# Patient Record
Sex: FEMALE | Race: WHITE | NOT HISPANIC OR LATINO | Employment: OTHER | URBAN - METROPOLITAN AREA
[De-identification: names, ages, dates, MRNs, and addresses within clinical notes are randomized per-mention and may not be internally consistent; named-entity substitution may affect disease eponyms.]

---

## 2017-01-11 ENCOUNTER — OFFICE VISIT (OUTPATIENT)
Dept: OTOLARYNGOLOGY | Facility: CLINIC | Age: 77
End: 2017-01-11

## 2017-01-11 DIAGNOSIS — R49.0 DYSPHONIA: Primary | ICD-10-CM

## 2017-01-11 NOTE — PROGRESS NOTES
[facility]  Franklin Merlos Jr., M.D., F.A.C.S.  Almaz Lafleur M.D., M.P.H.  Jolly George, Ph.D., CCC/SLP  Charito Parker M.M. (voice), M.A., CCC/SLP  Aguila Almaguer M.M. (voice), M.A., [facility]/SLP    [facility]  VOICE/SPEECH/BREATHING THERAPY PROGRESS REPORT    Patient: Kate Lima  Date of Service: 1/11/2017    PROGRESS SINCE LAST SESSION  Ms. Lima was seen for evaluation on 12/9/16.  At that time, it was determined that:    her dysphonia is accounted for by laryngeal irritation and supraglottic hyperfunction.  There were no suspicious lesions in the larynx or on the vocal fold mucosa.    because there appears to be a functional component to her dysphonia, she would most likely benefit from functional speech therapy.    She is entirely amenable to this plan    RATIONALE: Current level of functioning is:  CJ - At least 20 percent but less than 40 percent impaired, limited, or restricted  based on Ms. Lima's extent of dysphonia, extent of impact on function, extent of discomfort, level of effort.     Since then, she states she has not had any change in her voice, which is expected, as no therapeutic suggestions were made at the time.    Ms. Lima also states that:    She did look a little bit at the /s/ words and loaded phrases that she received from Charlotte Gage during her last course of therapy.      Ms. Lima presents today with the following:    Breathing pattern: appears within normal limits and adequate  and inspirations are inadequate in volume and frequency, breath is not well connected to phonation    Tension is evident: upper body and neck and shoulders    Voice quality is characterized by    Moderate to severe glottal charlton vs roughness    Habitual pitch is around G3    THERAPEUTIC ACTIVITIES  Today Ms. Lima participated in the following therapeutic activities:    Asked many questions about the nature of her symptoms, and I answered all of these thoroughly.    [place]  concepts and technqiues for using saline and plain-water gargling, nasal irrigation, and steam to reduce the thickened secretions / laryngeal irritation.  I recommended avoiding alcohol based mouthwash, and trying Biotine, to help reduce oral dryness and irritation    I provided additional dry mouth reduction strategies.    [place] concepts and techniques for improving topical and systemic hydration     [place] concepts and strategies managing laryngopharyngeal reflux disorder, to reduce laryngeal inflammation.    I provided instruction for techniques and strategies to reduce the chronic cough/throat clearing  o alternative behaviors such as voiceless glottic coup, humming, swallowing, etc. were taught  o strategies for reducing mucosal irritation were taught    [place] basic techniques for optimal breathing technique.    [place] exercises to add phonation to the optimal flowing airstream.  o Semi-occluded vocal tract exercises with straw phonation and water resistance were most facilitating  o Instructed to use as a voice warm up, cool down, coordination of breath flow with phonation, and for tissue mobilization.  o good learning, but will need practice     [place] exercises for improved glottic closure.  o Difficulty producing an acceptable glottic coup; will continue instruction during therapy sessions    [place] exercises for improved airflow during phonation.  o speech material with easy onsets and glides was facilitating  o Progressed from transitioning between voiceless and voiced sounds to H+vowel and word level  o Instructed at the word and phrase level.  o learned techniques to reduce glottal charlton and improve breath flow    [place] strategies to reduce jaw tension  o I recommended practicing a 2 finger width stretch; this was helpful    [place] concepts of an optimal regimen for practice.  o she should use an interval schedule of practice, with brief periods of practice frequently throughout each  day  o [place] concepts of volitional practice to facilitate motor learning.    I provided an after visit summary to help facilitate practice.    An audio recording of today's therapeutic activities was provided, to facilitate practice.    IMPRESSIONS/GOALS/PLAN  Ms. Lima had a productive session of therapy today, working on techniques/strategies/exercises that will help her achieve her goal of acceptable and comfortable voice use, secondary to dysphonia; allowing her to meet personal and professional vocal demands and fully engage in activities of daily living. She will continue to work on her exercises on a daily basis, and work on incorporating the techniques into her daily vocal activities.    Progress toward long-term goals:   Minimal at this point, as this is first session, but good learning today    Goals for this practice period:     practice all exercises according to instructions    incorporate techniques into daily vocal activities    maintain vigilance for vocal technique    Plan: I will see Ms. Lima in early March to work on education, modification, and carryover of therapeutic activities to more complex phonatory tasks.    PRIMARY ICD-10 code: R49.0 (Dysphonia)    TOTAL SERVICE TIME: 60 minutes  TREATMENT (09242): 60 minutes  NO CHARGE FACILITY FEE (29310)    Charito Parker M.M. (voice) MOlgaA., CCC/SLP  Speech-Language Pathologist  [facility]  [phone]

## 2017-01-11 NOTE — Clinical Note
1/11/2017       RE: Kate Lima  [address] [address]     Dear Colleague,    Thank you for referring your patient, Kate Lima, to the [facility]. Please see a copy of my visit note below.    [facility]  Franklin Merlos Jr., M.D., F.A.C.S.  Almaz Lafleur M.D., M.P.H.  Jolly George, Ph.D., CCC/SLP  Charito Parker M.M. (voice), M.A., CCC/SLP  Aguila Almaguer M.M. (voice), M.A., [facility]/SLP    [facility]  VOICE/SPEECH/BREATHING THERAPY PROGRESS REPORT    Patient: Kate Lima  Date of Service: 1/11/2017    PROGRESS SINCE LAST SESSION  Ms. Lima was seen for evaluation on 12/9/16.  At that time, it was determined that:    her dysphonia is accounted for by laryngeal irritation and supraglottic hyperfunction.  There were no suspicious lesions in the larynx or on the vocal fold mucosa.    because there appears to be a functional component to her dysphonia, she would most likely benefit from functional speech therapy.    She is entirely amenable to this plan    RATIONALE: Current level of functioning is:  CJ - At least 20 percent but less than 40 percent impaired, limited, or restricted  based on Ms. Lima's extent of dysphonia, extent of impact on function, extent of discomfort, level of effort.     Since then, she states she has not had any change in her voice, which is expected, as no therapeutic suggestions were made at the time.    Ms. Lima also states that:    She did look a little bit at the /s/ words and loaded phrases that she received from Charlotte Gage during her last course of therapy.      Ms. Lima presents today with the following:    Breathing pattern: appears within normal limits and adequate  and inspirations are inadequate in volume and frequency, breath is not well connected to phonation    Tension is evident: upper body and neck and shoulders    Voice quality is characterized by    Moderate to  severe glottal charlton vs roughness    Habitual pitch is around G3    THERAPEUTIC ACTIVITIES  Today Ms. Lima participated in the following therapeutic activities:    Asked many questions about the nature of her symptoms, and I answered all of these thoroughly.    [place] concepts and technqiues for using saline and plain-water gargling, nasal irrigation, and steam to reduce the thickened secretions / laryngeal irritation.  I recommended avoiding alcohol based mouthwash, and trying Biotine, to help reduce oral dryness and irritation    I provided additional dry mouth reduction strategies.    [place] concepts and techniques for improving topical and systemic hydration     [place] concepts and strategies managing laryngopharyngeal reflux disorder, to reduce laryngeal inflammation.    I provided instruction for techniques and strategies to reduce the chronic cough/throat clearing  o alternative behaviors such as voiceless glottic coup, humming, swallowing, etc. were taught  o strategies for reducing mucosal irritation were taught    [place] basic techniques for optimal breathing technique.    [place] exercises to add phonation to the optimal flowing airstream.  o Semi-occluded vocal tract exercises with straw phonation and water resistance were most facilitating  o Instructed to use as a voice warm up, cool down, coordination of breath flow with phonation, and for tissue mobilization.  o good learning, but will need practice     [place] exercises for improved glottic closure.  o Difficulty producing an acceptable glottic coup; will continue instruction during therapy sessions    [place] exercises for improved airflow during phonation.  o speech material with easy onsets and glides was facilitating  o Progressed from transitioning between voiceless and voiced sounds to H+vowel and word level  o Instructed at the word and phrase level.  o learned techniques to reduce glottal charlton and improve breath flow    [place]  strategies to reduce jaw tension  o I recommended practicing a 2 finger width stretch; this was helpful    [place] concepts of an optimal regimen for practice.  o she should use an interval schedule of practice, with brief periods of practice frequently throughout each day  o [place] concepts of volitional practice to facilitate motor learning.    I provided an after visit summary to help facilitate practice.    An audio recording of today's therapeutic activities was provided, to facilitate practice.    IMPRESSIONS/GOALS/PLAN  Ms. Lima had a productive session of therapy today, working on techniques/strategies/exercises that will help her achieve her goal of acceptable and comfortable voice use, secondary to dysphonia; allowing her to meet personal and professional vocal demands and fully engage in activities of daily living. She will continue to work on her exercises on a daily basis, and work on incorporating the techniques into her daily vocal activities.    Progress toward long-term goals:   Minimal at this point, as this is first session, but good learning today    Goals for this practice period:     practice all exercises according to instructions    incorporate techniques into daily vocal activities    maintain vigilance for vocal technique    Plan: I will see Ms. Lima in early March to work on education, modification, and carryover of therapeutic activities to more complex phonatory tasks.    PRIMARY ICD-10 code: R49.0 (Dysphonia)    TOTAL SERVICE TIME: 60 minutes  TREATMENT (13685): 60 minutes  NO CHARGE FACILITY FEE (37418)    Charito Parker M.M. (voice), M.A., CCC/SLP  Speech-Language Pathologist  [facility]  [phone]                  After Visit Summary    Patient: Kate Lima  Date of Visit: 1/11/2017    Hygiene:     Systemic Hydration: internal hydration of the entire body  o Continue to sip water regularly    *Try herbal teas      Gargle  o With a voice  o Tilt your head side to  "side  o [place] chirp -  betty hernández     Dry hard swallow    Suck on a lozenge with Pectin (avoid mint or menthol) or a sugar-free./ xylitol candy, gum, \"wet\" snacks (apples, pineapple, grapes, etc)   * Check with your dentist about using Biotine mouthwash and toothpaste.      Breathing:    In the morning and evening (twice daily) for 2-5 minutes:   o Breathe while lying on your back with your face and knees up. Hands on tummy and chest.  Take a breath in with rounded lips and exhale with a  Sh    o Inhale  = Inflate; exhale = deflate  o Throughout the day (2-3x/day for just a couple minutes) check breathing while keeping shoulders relaxed (riding to and from school, etc.)    Breathing Tips:  o Keep shoulders down, and chest relaxed    Voice:    Bubbles (straw in 1.5 to 2  of water):  o blow 10-15 seconds with no voice and keep bubbles consistent.  o 3x: blow bubbles and add a sustained  who  or an  oo  (G3)  o 3x: blow bubbles and vary  who  gliding up and down             Up and down like a sine wave  o 3x: blow bubbles on a sustained/ varied pitch soft to loud to soft (messa di voce)    o 1-2x: Happy birthday bubbles (keep connected)  These exercises are great for:    *warm up / cool down - Part of the morning routing and before and after extended voice use.    *tissue mobilization exercise - Improving the condition and pliability of the vocal folds.    *Abdominal breathing and applying optimal breath flow to speech/singing.         5x alternating between voiceless and voiced: SSSSSsssssZZzzzzzzzzzzSSssssss     u  words (2-3 x per day)  o 5 words with use of arm to help direct pitch and breath flow  o Breathe first and add a  yawn-sigh  shape to sound    Speak as if to 5-8 people       Spacious speech phrases  (2-3x per day)  o 2nd column: H+ vowel    Work on relaxing your jaw - use a mirror to help     Speak as if to 5-8 people    *Jaw relaxation - 2 fingers hold for 5 seconds and repeat 3x; " 3-4x/day    *Bring Lunch and Learn presentation to next session.    Charito Parker M.M. (voice), M.A., CCC/SLP  Speech-Language Pathologist  [facility]  [phone]

## 2017-01-11 NOTE — PROGRESS NOTES
"After Visit Summary    Patient: Kate Lima  Date of Visit: 1/11/2017    Hygiene:     Systemic Hydration: internal hydration of the entire body  o Continue to sip water regularly    *Try herbal teas      Gargle  o With a voice  o Tilt your head side to side  o [place] chirp -  betty roqueaa     Dry hard swallow    Suck on a lozenge with Pectin (avoid mint or menthol) or a sugar-free./ xylitol candy, gum, \"wet\" snacks (apples, pineapple, grapes, etc)   * Check with your dentist about using Biotine mouthwash and toothpaste.      Breathing:    In the morning and evening (twice daily) for 2-5 minutes:   o Breathe while lying on your back with your face and knees up. Hands on tummy and chest.  Take a breath in with rounded lips and exhale with a  Sh    o Inhale  = Inflate; exhale = deflate  o Throughout the day (2-3x/day for just a couple minutes) check breathing while keeping shoulders relaxed (riding to and from school, etc.)    Breathing Tips:  o Keep shoulders down, and chest relaxed    Voice:    Bubbles (straw in 1.5 to 2  of water):  o blow 10-15 seconds with no voice and keep bubbles consistent.  o 3x: blow bubbles and add a sustained  who  or an  oo  (G3)  o 3x: blow bubbles and vary  who  gliding up and down             Up and down like a sine wave  o 3x: blow bubbles on a sustained/ varied pitch soft to loud to soft (messa di voce)    o 1-2x: Happy birthday bubbles (keep connected)  These exercises are great for:    *warm up / cool down - Part of the morning routing and before and after extended voice use.    *tissue mobilization exercise - Improving the condition and pliability of the vocal folds.    *Abdominal breathing and applying optimal breath flow to speech/singing.         5x alternating between voiceless and voiced: SSSSSsssssZZzzzzzzzzzzSSssssss     u  words (2-3 x per day)  o 5 words with use of arm to help direct pitch and breath flow  o Breathe first and add a  yawn-sigh  shape to " sound    Speak as if to 5-8 people       Spacious speech phrases  (2-3x per day)  o 2nd column: H+ vowel    Work on relaxing your jaw - use a mirror to help     Speak as if to 5-8 people    *Jaw relaxation - 2 fingers hold for 5 seconds and repeat 3x; 3-4x/day    *Bring Lunch and Learn presentation to next session.    Charito Parker M.M. (voice), MOlgaA., CCC/SLP  Speech-Language Pathologist  [facility]  [phone]

## 2017-01-12 ENCOUNTER — APPOINTMENT (OUTPATIENT)
Dept: GENERAL RADIOLOGY | Facility: CLINIC | Age: 77
End: 2017-01-12
Attending: EMERGENCY MEDICINE
Payer: MEDICARE

## 2017-01-12 ENCOUNTER — HOSPITAL ENCOUNTER (EMERGENCY)
Facility: CLINIC | Age: 77
Discharge: HOME | End: 2017-01-12
Attending: EMERGENCY MEDICINE | Admitting: EMERGENCY MEDICINE
Payer: MEDICARE

## 2017-01-12 VITALS
RESPIRATION RATE: 15 BRPM | TEMPERATURE: 97.7 F | DIASTOLIC BLOOD PRESSURE: 73 MMHG | OXYGEN SATURATION: 99 % | SYSTOLIC BLOOD PRESSURE: 136 MMHG | HEIGHT: 64 IN | HEART RATE: 73 BPM

## 2017-01-12 DIAGNOSIS — S62.667B OPEN NONDISPLACED FRACTURE OF DISTAL PHALANX OF LEFT LITTLE FINGER, INITIAL ENCOUNTER: ICD-10-CM

## 2017-01-12 PROCEDURE — 29130 APPL FINGER SPLINT STATIC: CPT | Mod: F4

## 2017-01-12 PROCEDURE — 99284 EMERGENCY DEPT VISIT MOD MDM: CPT

## 2017-01-12 PROCEDURE — A9270 NON-COVERED ITEM OR SERVICE: HCPCS | Mod: GY | Performed by: EMERGENCY MEDICINE

## 2017-01-12 PROCEDURE — 25000132 ZZH RX MED GY IP 250 OP 250 PS 637: Mod: GY | Performed by: EMERGENCY MEDICINE

## 2017-01-12 PROCEDURE — 73140 X-RAY EXAM OF FINGER(S): CPT | Mod: LT

## 2017-01-12 RX ORDER — CEPHALEXIN 500 MG/1
500 CAPSULE ORAL 3 TIMES DAILY
Qty: 21 CAPSULE | Refills: 0 | Status: SHIPPED | OUTPATIENT
Start: 2017-01-12 | End: 2017-01-19

## 2017-01-12 RX ORDER — CEPHALEXIN 500 MG/1
500 CAPSULE ORAL ONCE
Status: COMPLETED | OUTPATIENT
Start: 2017-01-12 | End: 2017-01-12

## 2017-01-12 RX ADMIN — CEPHALEXIN 500 MG: 500 CAPSULE ORAL at 22:46

## 2017-01-12 NOTE — ED AVS SNAPSHOT
Emergency Department    [address] [place] [address]    Phone:  [phone]    Fax:  [phone]                                       Kate Lima   MRN: [id number]    Department:   Emergency Department   Date of Visit:  1/12/2017           After Visit Summary Signature Page     I have received my discharge instructions, and my questions have been answered. I have discussed any challenges I see with this plan with the nurse or doctor.    ..........................................................................................................................................  Patient/Patient Representative Signature      ..........................................................................................................................................  Patient Representative Print Name and Relationship to Patient    ..................................................               ................................................  Date                                            Time    ..........................................................................................................................................  Reviewed by Signature/Title    ...................................................              ..............................................  Date                                                            Time

## 2017-01-12 NOTE — ED AVS SNAPSHOT
Emergency Department    [address] [facility] [address]    Phone:  [phone]    Fax:  [phone]                                       Kate Lima   MRN: [id number]    Department:   Emergency Department   Date of Visit:  1/12/2017           Patient Information     Date Of Birth          1940        Your diagnoses for this visit were:     Open nondisplaced fracture of distal phalanx of left little finger, initial encounter        You were seen by Isabelle Fernando MD.      Follow-up Information     Schedule an appointment as soon as possible for a visit with Bao Logan MD.    Specialty:  Internal Medicine    Why:  As needed    Contact information:    [facility]  [address] [address]  [phone]          Discharge Instructions       Ice, elevate, change the bandaid daily, splint for protection and comfort.  Recheck in the clinic if you develop any signs of infection.  Aleve as needed.          Finger Fracture, Open  You have a broken finger (fracture) with a nearby cut, puncture, or deep scrape. This causes local pain, swelling, and bruising. Because of the open injury, you are at risk for infection in the skin and bone. You will take antibiotics to lower the risk for infection.   This injury takes about 4 weeks to heal. Finger injuries are often treated with a splint or cast, or by taping the injured finger to the next one (buddy taping). This protects the injured finger and holds the bone in position while it heals. More serious fractures may need surgery.  If the fingernail has been severely injured, it will probably fall off in 1 to 2 weeks. A new fingernail will usually start to grow back within a month.  Home care  Follow these guidelines when caring for yourself at home:    Keep your hand elevated to reduce pain and swelling. When sitting or lying down keep your arm above the level of your heart. You can do this by placing your arm on a  pillow that rests on your chest or on a pillow at your side. This is most important during the first 2 days (48 hours) after the injury.    Put an ice pack on the injured area. Do this for 20 minutes every 1 to 2 hours the first day for pain relief. You can make an ice pack by wrapping a plastic bag of ice cubes in a thin towel. As the ice melts, be careful that the cast or splint doesn t get wet. Continue using the ice pack 3 to 4 times a day until the pain and swelling go away.    Keep the cast or splint completely dry at all times. Bathe with your cast or splint out of the water. Protect it with a large plastic bag, rubber-banded at the top end. If a fiberglass cast or splint gets wet, you can dry it with a hair dryer.    You may use acetaminophen or ibuprofen to control pain, unless another pain medicine was prescribed. If you have chronic liver or kidney disease, talk with your health care provider before using these medicines. Also talk with your provider if you ve had a stomach ulcer or GI bleeding.    If aleja tape was applied and it becomes wet or dirty, change it. You may replace it with paper, plastic, or cloth tape. Cloth tape and paper tapes must be kept dry. Keep the aleja tape in place for at least 4 weeks.    Take all antibiotics until you have finished them.    Don t put creams or objects under the cast if you have itching.  Follow-up care  Follow up with your health care provider within 1 week, or as advised. This is to make sure the bone is healing the way it should.  If X-rays were taken, a radiologist will look at them. You will be told of any new findings that may affect your care.  When to seek medical advice  Call your health care provider right away if any of these occur:    The cast cracks    The plaster cast or splint becomes wet or soft    The fiberglass cast or splint stays wet for more than 24 hours    Pain or swelling gets worse    Tightness or pressure under the cast gets  worse    Finger becomes cold, blue, numb, or tingly    You can t move your finger    Redness, warmth, swelling, drainage from the wound, or foul odor from a cast or splint    Fever of 101 F (38 C) or higher, or as directed by your health care provider     2311-6641 [employer]. [address] [place] [address]. All rights reserved. This information is not intended as a substitute for professional medical care. Always follow your healthcare professional's instructions.          Future Appointments        Provider Department Dept Phone Center    3/8/2017 9:00 AM MARY ANNE Mckinney [facility] [phone] [facility]    3/22/2017 9:00 AM MARY ANNE Mckinney [facility] [phone] [facility]      24 Hour Appointment Hotline       To make an appointment at any [facility], call [phone] ([phone]). If you don't have a family doctor or clinic, we will help you find one. [facility] clinics are conveniently located to serve the needs of you and your family.             Review of your medicines      START taking        Dose / Directions Last dose taken    cephALEXin 500 MG capsule   Commonly known as:  KEFLEX   Dose:  500 mg   Quantity:  21 capsule        Take 1 capsule (500 mg) by mouth 3 times daily for 7 days   Refills:  0          Our records show that you are taking the medicines listed below. If these are incorrect, please call your family doctor or clinic.        Dose / Directions Last dose taken    CALCIUM 600+D PO        Take  by mouth daily.   Refills:  0        cholecalciferol 1000 UNITS capsule   Commonly known as:  vitamin  -D   Dose:  1 capsule        Take 1 capsule by mouth daily.   Refills:  0        ELIDEL 1 % cream   Generic drug:  pimecrolimus        Apply  topically 2 times daily.   Refills:  0        estradiol 0.1 MG/GM cream   Commonly known as:  ESTRACE VAGINAL   Dose:  1 g   Quantity:  42.5 g        Place 1 g vaginally twice a week   Refills:  2        ESTRADIOL  0.1 MG/GM NON-ALCOHOL GEL        Insert 1 gram vaginally once a week   Refills:  0        fiber modular packet        3 times daily (with meals)   Refills:  0        * fluocinonide 0.05 % ointment   Commonly known as:  LIDEX        Refills:  0        * fluocinonide 0.05 % solution   Commonly known as:  LIDEX        apply to ears PRN per derm   Refills:  0        glucosamine 500 MG Tabs        Take  by mouth daily.   Refills:  0        hydrocortisone 0.2 % cream   Commonly known as:  WESTCORT        Apply  topically 2 times daily.   Refills:  0        MUPIROCIN EX        Externally apply  topically.   Refills:  0        XALATAN 0.005 % ophthalmic solution   Dose:  1 drop   Generic drug:  latanoprost        1 drop At Bedtime.   Refills:  0        * Notice:  This list has 2 medication(s) that are the same as other medications prescribed for you. Read the directions carefully, and ask your doctor or other care provider to review them with you.            Prescriptions were sent or printed at these locations (1 Prescription)                   Other Prescriptions                Printed at Department/Unit printer (1 of 1)         cephALEXin (KEFLEX) 500 MG capsule                Procedures and tests performed during your visit     Fingers XR, 2-3 views, left      Orders Needing Specimen Collection     None      Pending Results     Date and Time Order Name Status Description    1/12/2017 2113 Fingers XR, 2-3 views, left Preliminary             Pending Culture Results     No orders found from 1/11/2017 to 1/13/2017.       Test Results from your hospital stay           1/12/2017  9:58 PM - Interface, Radiant Ib      Narrative     LEFT FINGER TWO OR MORE VIEWS 1/12/2017 9:42 PM     COMPARISON: None.    HISTORY: Slammed finger in door.        Impression     IMPRESSION: There is a nondisplaced fracture through the proximal  metaphysis of the distal phalanx of the left fifth finger. No other  fractures.                 Clinical  Quality Measure: Blood Pressure Screening     Your blood pressure was checked while you were in the emergency department today. The last reading we obtained was  BP: 136/73 mmHg . Please read the guidelines below about what these numbers mean and what you should do about them.  If your systolic blood pressure (the top number) is less than 120 and your diastolic blood pressure (the bottom number) is less than 80, then your blood pressure is normal. There is nothing more that you need to do about it.  If your systolic blood pressure (the top number) is 120-139 or your diastolic blood pressure (the bottom number) is 80-89, your blood pressure may be higher than it should be. You should have your blood pressure rechecked within a year by a primary care provider.  If your systolic blood pressure (the top number) is 140 or greater or your diastolic blood pressure (the bottom number) is 90 or greater, you may have high blood pressure. High blood pressure is treatable, but if left untreated over time it can put you at risk for heart attack, stroke, or kidney failure. You should have your blood pressure rechecked by a primary care provider within the next 4 weeks.  If your provider in the emergency department today gave you specific instructions to follow-up with your doctor or provider even sooner than that, you should follow that instruction and not wait for up to 4 weeks for your follow-up visit.        Thank you for choosing [facility]       Thank you for choosing [facility] for your care. Our goal is always to provide you with excellent care. Hearing back from our patients is one way we can continue to improve our services. Please take a few minutes to complete the written survey that you may receive in the mail after you visit with us. Thank you!        [employer] Information     [employer] gives you secure access to your electronic health record. If you see a primary care provider, you can also send messages to your care team  and make appointments. If you have questions, please call your primary care clinic.  If you do not have a primary care provider, please call [phone] and they will assist you.        Care EveryWhere ID     This is your Care EveryWhere ID. This could be used by other organizations to access your [facility] medical records  [id number]        After Visit Summary       This is your record. Keep this with you and show to your community pharmacist(s) and doctor(s) at your next visit.

## 2017-01-13 NOTE — ED NOTES
Left fifth digit cleaned using Sea Cleanse and Normal Saline.   Adaptec and Bandaid applied following cleaning.  Patient was fitted with baseball finger splint to Immobilize the   finger injury/fracture and to provide stabilization and treatment.  RN was notified upon completion.  Yolette MCGHEE.......................................... 1/12/2017   10:51 PM

## 2017-01-13 NOTE — DISCHARGE INSTRUCTIONS
Ice, elevate, change the bandaid daily, splint for protection and comfort.  Recheck in the clinic if you develop any signs of infection.  Aleve as needed.          Finger Fracture, Open  You have a broken finger (fracture) with a nearby cut, puncture, or deep scrape. This causes local pain, swelling, and bruising. Because of the open injury, you are at risk for infection in the skin and bone. You will take antibiotics to lower the risk for infection.   This injury takes about 4 weeks to heal. Finger injuries are often treated with a splint or cast, or by taping the injured finger to the next one (buddy taping). This protects the injured finger and holds the bone in position while it heals. More serious fractures may need surgery.  If the fingernail has been severely injured, it will probably fall off in 1 to 2 weeks. A new fingernail will usually start to grow back within a month.  Home care  Follow these guidelines when caring for yourself at home:    Keep your hand elevated to reduce pain and swelling. When sitting or lying down keep your arm above the level of your heart. You can do this by placing your arm on a pillow that rests on your chest or on a pillow at your side. This is most important during the first 2 days (48 hours) after the injury.    Put an ice pack on the injured area. Do this for 20 minutes every 1 to 2 hours the first day for pain relief. You can make an ice pack by wrapping a plastic bag of ice cubes in a thin towel. As the ice melts, be careful that the cast or splint doesn t get wet. Continue using the ice pack 3 to 4 times a day until the pain and swelling go away.    Keep the cast or splint completely dry at all times. Bathe with your cast or splint out of the water. Protect it with a large plastic bag, rubber-banded at the top end. If a fiberglass cast or splint gets wet, you can dry it with a hair dryer.    You may use acetaminophen or ibuprofen to control pain, unless another pain  medicine was prescribed. If you have chronic liver or kidney disease, talk with your health care provider before using these medicines. Also talk with your provider if you ve had a stomach ulcer or GI bleeding.    If aleja tape was applied and it becomes wet or dirty, change it. You may replace it with paper, plastic, or cloth tape. Cloth tape and paper tapes must be kept dry. Keep the aleja tape in place for at least 4 weeks.    Take all antibiotics until you have finished them.    Don t put creams or objects under the cast if you have itching.  Follow-up care  Follow up with your health care provider within 1 week, or as advised. This is to make sure the bone is healing the way it should.  If X-rays were taken, a radiologist will look at them. You will be told of any new findings that may affect your care.  When to seek medical advice  Call your health care provider right away if any of these occur:    The cast cracks    The plaster cast or splint becomes wet or soft    The fiberglass cast or splint stays wet for more than 24 hours    Pain or swelling gets worse    Tightness or pressure under the cast gets worse    Finger becomes cold, blue, numb, or tingly    You can t move your finger    Redness, warmth, swelling, drainage from the wound, or foul odor from a cast or splint    Fever of 101 F (38 C) or higher, or as directed by your health care provider     [id number] [employer]. [address] [place] [address]. All rights reserved. This information is not intended as a substitute for professional medical care. Always follow your healthcare professional's instructions.

## 2017-03-08 ENCOUNTER — OFFICE VISIT (OUTPATIENT)
Dept: OTOLARYNGOLOGY | Facility: CLINIC | Age: 77
End: 2017-03-08

## 2017-03-08 DIAGNOSIS — R49.0 DYSPHONIA: Primary | ICD-10-CM

## 2017-03-08 NOTE — PROGRESS NOTES
After Visit Summary    Patient: Kate Lima  Date of Visit: 3/8/2017    Continue gargling      Breathing Tips:  o Breathe in through rounded lips and out with a  sh   o Keep shoulders down  Voice:    Bubbles (straw in 1.5 to 2  of water):  o blow 10-15 seconds with no voice and keep bubbles consistent.  o 3x: blow bubbles and add a sustained  who  or an  oo  (comfortable)  o 3x: blow bubbles and vary  who  gliding up and down             Up and down like a sine wave  o 3x: blow bubbles on a sustained/ varied pitch soft to loud to soft (messa di voce)    o 1-2x: Jingle bells   These exercises are great for:    *warm up / cool down - Part of the morning routing and before and after extended voice use.    *tissue mobilization exercise - Improving the condition and pliability of the vocal folds.    *Abdominal breathing and applying optimal breath flow to speech/singing.     * Two fingers to work on relaxing jaw       u  words (2-3 x per day)  o 5 words with use of arm - from the beginning list and the end list  o Breathe first and add a  yawn and sigh  shape to sound     n  words  o hold onto the  n  at the beginning of the word     n  sentences  o First 10, but skip #9     g   Phrases  o First 3    *with all exercises, make sure to take a good breath, slow down to allow time to breathe  *keep water with you  *speak as if to an audience of 10 people.    Charito Parker M.M. (voice), M.A., CCC/SLP  Speech-Language Pathologist  [facility]  [phone]

## 2017-03-08 NOTE — LETTER
3/8/2017       RE: Kate Lima  [address] [facility] [address]     Dear Colleague,    Thank you for referring your patient, Kate Lima, to the  [facility] at [facility]. Please see a copy of my visit note below.    After Visit Summary    Patient: Kate Lima  Date of Visit: 3/8/2017    Continue gargling      Breathing Tips:  o Breathe in through rounded lips and out with a  sh   o Keep shoulders down  Voice:    Bubbles (straw in 1.5 to 2  of water):  o blow 10-15 seconds with no voice and keep bubbles consistent.  o 3x: blow bubbles and add a sustained  who  or an  oo  (comfortable)  o 3x: blow bubbles and vary  who  gliding up and down             Up and down like a sine wave  o 3x: blow bubbles on a sustained/ varied pitch soft to loud to soft (messa di voce)    o 1-2x: Jingle bells   These exercises are great for:    *warm up / cool down - Part of the morning routing and before and after extended voice use.    *tissue mobilization exercise - Improving the condition and pliability of the vocal folds.    *Abdominal breathing and applying optimal breath flow to speech/singing.     * Two fingers to work on relaxing jaw       u  words (2-3 x per day)  o 5 words with use of arm - from the beginning list and the end list  o Breathe first and add a  yawn and sigh  shape to sound     n  words  o hold onto the  n  at the beginning of the word     n  sentences  o First 10, but skip #9     g   Phrases  o First 3    *with all exercises, make sure to take a good breath, slow down to allow time to breathe  *keep water with you  *speak as if to an audience of 10 people.    Charito Parker M.M. (voice), M.A., CCC/SLP  Speech-Language Pathologist  [facility]  [phone]              [facility]  Franklin Merlos Jr., M.D., F.A.C.S.  Almaz Lafleur M.D., M.P.H.  Jolly George, Ph.D., CCC/SLP  Charito Parker M.M. (voice), M.A., CCC/SLP  Aguila Almaguer,  "REILLY (voice), M.A., [facility]/SLP    [facility]  VOICE/SPEECH/BREATHING THERAPY PROGRESS REPORT    Patient: Kate Lima  Date of Service: 3/8/2017    PROGRESS SINCE LAST SESSION  Ms. Lima was last seen on 1/11/7. At that time, she worked on learning therapeutic activities to improve her voice quality and comfort, secondary to dysphonia; allowing  her to meet personal and professional vocal demands and fully engage in activities of daily living.    Regarding practice, Ms. Lima reports the following:     irregular practice     the recording is not helpful    The after visit summary is helpful to facilitate practice.    voice is mildly improved during the exercises, but minimal carryover to spontaneous conversation    Ms. Lima also states that:    [today is rough]    Still requires effort to improve volume    Still \"foggy\" voiced    Feels she is monotone when speaking    Dehydrated again; very sensitivtive to salt even a little piece of ham produces a reaction    She has been gargling warm water, and trying to drink more water throughout the day.    Recently, it was recommended that she have surgery to improve her scoliosis symptoms.  She is instead beginning an intense course of therapy to avoid surgery as it was not clear how much benefit she would receive.      She also will be getting tooth implants, and is trying to resolve an injury to one of her fingers.      It has been difficult to focus on these other health issues, as well as her voice.    Changed to biotiene and mouthwash.    Herbal tea has been good    Can only stand for 10 minutes.     Ms. Lima presents today with the following:    Moderate to severe glottal charlton vs roughness; moments of improved voice quality, but overall very rough today.    Habitual pitch is around G3    THERAPEUTIC ACTIVITIES  Today Ms. Lima participated in the following therapeutic activities:    Demonstrated previous exercises.  o demonstrated improved " technique  o appropriate redirection provided  o instruction provided for increased level of complexity/difficulty    Exercises to add phonation to the optimal flowing airstream.  o Semi-occluded vocal tract exercises with straw phonation and water resistance were most facilitating  o Instructed to use as a voice warm up, cool down, coordination of breath flow with phonation, and for tissue mobilization.  o good learning, but will need practice     Ensign exercises for improved glottic closure.  o able to produce acceptable glottic coup  o Instructed at the phrase level  o Had difficulty maintaining optimal breath flow beyond the first 3 phrases (approx 5 words)  o Recommended speaking aloud as if to an audience, this was somewhat helpful    Ensign exercises for improved airflow during phonation.  o speech material with glides was facilitating  o Instructed at the word level.  o learned techniques to reduce glottal charlton and improve breath flow  o Recommended speaking aloud as if to an audience, this was somewhat helpful  o Demonstrated difficulty with the focus of her resonance today.  Often switching to a back, covered focus.    o Moderate cues and modeling was somewhat successful.    Ensign exercises to experience a more forward sensation during phonation.  o speech material with nasal continuants was facilitating  o speech material that elicits a high, forward tongue position was facilitating  o able to recognize improvement in quality and comfort  o able to progress to level of words and phrases  o Recommended speaking aloud as if to an audience, this was somewhat helpful  o Demonstrated difficulty maintaining an optimal forward focus, experiencing vibration towards the front of the face and maintaining an optimal connection between breath flow and speech..    Ensign concepts of an optimal regimen for practice.  o she should use an interval schedule of practice, with brief periods of practice frequently  throughout each day  o Cowgill concepts of volitional practice to facilitate motor learning.    I provided an after visit summary to help facilitate practice.    An audio recording of today's therapeutic activities was provided, to facilitate practice.    IMPRESSIONS/GOALS/PLAN  Ms. Lima had a productive session of therapy today, working on techniques/strategies/exercises that will help her achieve her goal of acceptable and comfortable voice use, secondary to dysphonia; allowing her to meet personal and professional vocal demands and fully engage in activities of daily living.  Today, she demonstrated difficulty achieving and maintaining an optimal voice quality with the techniques instructed.  She also has difficulty hearing the subtle changes (positive and negative) in her voice quality, although she is aware that her voice quality is poor.  We agreed to continue her course of therapy after she has completed other assessments to help treat and manage her other health issues; however, a follow up with Dr. Lafleur is likely and we discussed pursuing a follow up evaluation after her final two sessions of therapy.    Progress toward long-term goals:   Adequate progress; too early for objective measures    Goals for this practice period:     practice all exercises according to instructions    incorporate techniques into daily vocal activities    maintain vigilance for vocal technique    Plan: I will see Ms. Lima in May to work on education, modification, and carryover of therapeutic activities to more complex phonatory tasks.    PRIMARY ICD-10 code: R49.0 (Dysphonia)    TOTAL SERVICE TIME: 60 minutes  TREATMENT (08355): 60 minutes  NO CHARGE FACILITY FEE (06159)    Charito Parker M.M. (voice), M.A., CCC/SLP  Speech-Language Pathologist  [facility]  [phone]

## 2017-03-08 NOTE — PROGRESS NOTES
"[facility] VOICE [facility]  Franklin Merlos Jr., M.D., F.A.C.S.  Almaz Lafleur M.D., M.P.H.  Jolly George, Ph.D., CCC/SLP  Charito Parker M.M. (voice), M.A., CCC/SLP  Aguila Almaguer M.M. (voice), MMUMTAZ., [facility]/SLP    [facility] VOICE [facility]  VOICE/SPEECH/BREATHING THERAPY PROGRESS REPORT    Patient: Kate Lima  Date of Service: 3/8/2017    PROGRESS SINCE LAST SESSION  Ms. Lima was last seen on 1/11/7. At that time, she worked on learning therapeutic activities to improve her voice quality and comfort, secondary to dysphonia; allowing  her to meet personal and professional vocal demands and fully engage in activities of daily living.    Regarding practice, Ms. Lima reports the following:     irregular practice     the recording is not helpful    The after visit summary is helpful to facilitate practice.    voice is mildly improved during the exercises, but minimal carryover to spontaneous conversation    Ms. Lima also states that:    [today is rough]    Still requires effort to improve volume    Still \"foggy\" voiced    Feels she is monotone when speaking    Dehydrated again; very sensitivtive to salt even a little piece of ham produces a reaction    She has been gargling warm water, and trying to drink more water throughout the day.    Recently, it was recommended that she have surgery to improve her scoliosis symptoms.  She is instead beginning an intense course of therapy to avoid surgery as it was not clear how much benefit she would receive.      She also will be getting tooth implants, and is trying to resolve an injury to one of her fingers.      It has been difficult to focus on these other health issues, as well as her voice.    Changed to biotiene and mouthwash.    Herbal tea has been good    Can only stand for 10 minutes.     Ms. Lima presents today with the following:    Moderate to severe glottal charlton vs roughness; moments of improved voice quality, but overall very rough today.    Habitual pitch " is around G3    THERAPEUTIC ACTIVITIES  Today Ms. Lima participated in the following therapeutic activities:    Demonstrated previous exercises.  o demonstrated improved technique  o appropriate redirection provided  o instruction provided for increased level of complexity/difficulty    Exercises to add phonation to the optimal flowing airstream.  o Semi-occluded vocal tract exercises with straw phonation and water resistance were most facilitating  o Instructed to use as a voice warm up, cool down, coordination of breath flow with phonation, and for tissue mobilization.  o good learning, but will need practice     [place] exercises for improved glottic closure.  o able to produce acceptable glottic coup  o Instructed at the phrase level  o Had difficulty maintaining optimal breath flow beyond the first 3 phrases (approx 5 words)  o Recommended speaking aloud as if to an audience, this was somewhat helpful    [place] exercises for improved airflow during phonation.  o speech material with glides was facilitating  o Instructed at the word level.  o learned techniques to reduce glottal charlton and improve breath flow  o Recommended speaking aloud as if to an audience, this was somewhat helpful  o Demonstrated difficulty with the focus of her resonance today.  Often switching to a back, covered focus.    o Moderate cues and modeling was somewhat successful.    [place] exercises to experience a more forward sensation during phonation.  o speech material with nasal continuants was facilitating  o speech material that elicits a high, forward tongue position was facilitating  o able to recognize improvement in quality and comfort  o able to progress to level of words and phrases  o Recommended speaking aloud as if to an audience, this was somewhat helpful  o Demonstrated difficulty maintaining an optimal forward focus, experiencing vibration towards the front of the face and maintaining an optimal connection between breath  flow and speech..    [place] concepts of an optimal regimen for practice.  o she should use an interval schedule of practice, with brief periods of practice frequently throughout each day  o [place] concepts of volitional practice to facilitate motor learning.    I provided an after visit summary to help facilitate practice.    An audio recording of today's therapeutic activities was provided, to facilitate practice.    IMPRESSIONS/GOALS/PLAN  Ms. Lima had a productive session of therapy today, working on techniques/strategies/exercises that will help her achieve her goal of acceptable and comfortable voice use, secondary to dysphonia; allowing her to meet personal and professional vocal demands and fully engage in activities of daily living.  Today, she demonstrated difficulty achieving and maintaining an optimal voice quality with the techniques instructed.  She also has difficulty hearing the subtle changes (positive and negative) in her voice quality, although she is aware that her voice quality is poor.  We agreed to continue her course of therapy after she has completed other assessments to help treat and manage her other health issues; however, a follow up with Dr. Lafleur is likely and we discussed pursuing a follow up evaluation after her final two sessions of therapy.    Progress toward long-term goals:   Adequate progress; too early for objective measures    Goals for this practice period:     practice all exercises according to instructions    incorporate techniques into daily vocal activities    maintain vigilance for vocal technique    Plan: I will see Ms. Lima in May to work on education, modification, and carryover of therapeutic activities to more complex phonatory tasks.    PRIMARY ICD-10 code: R49.0 (Dysphonia)    TOTAL SERVICE TIME: 60 minutes  TREATMENT (04729): 60 minutes  NO CHARGE FACILITY FEE (64695)    Charito Parker M.M. (voice) M.AOlga, CCC/SLP  Speech-Language Pathologist  [facility]  [facility]  [phone]

## 2017-03-08 NOTE — MR AVS SNAPSHOT
After Visit Summary   3/8/2017    Kate Lima    MRN: [id number]           Patient Information     Date Of Birth          1940        Visit Information        Provider Department      3/8/2017 9:00 AM Charito Parker [facility]LP [facility]        Today's Diagnoses     Dysphonia    -  1       Follow-ups after your visit        Your next 10 appointments already scheduled     May 11, 2017  9:00 AM CDT   PHYSICAL with Jorje Reyes MD   [facility] ([facility])    [address] [place] [address] [address]  [facility] [id number] [phone]            May 11, 2017  9:30 AM CDT   MA SCREENING DIGITAL BILATERAL with WEMA1   [facility] ([facility])    [address] [place] [address] [address]  [facility] [id number] [phone]           Do not use any powder, lotion or deodorant under your arms or on your breast. If you do, we will ask you to remove it before your exam.  Wear comfortable, two-piece clothing.  If you have any allergies, tell your care team.  Bring any previous mammograms from other facilities or have them mailed to the breast center.            May 31, 2017  9:00 AM CDT   (Arrive by 8:45 AM)   RETURN SLP VOICE with MARY ANNE [facility] ([facility])    [address] [place] [id number] [phone]            Jun 14, 2017  9:00 AM CDT   (Arrive by 8:45 AM)   RETURN SLP VOICE with MARY ANNE [facility] ([facility])    [address] [place] [id number] [phone]              Who to contact     Please call your clinic at [phone] to:    Ask questions about your health    Make or cancel appointments    Discuss your medicines    Learn about your test results    Speak to your doctor   If you have compliments or concerns about an experience at your clinic, or if you wish to file  a complaint, please contact [facility] Patient Relations at [phone] or email us at [email or web address]         Additional Information About Your Visit        [employer] Information     [employer] gives you secure access to your electronic health record. If you see a primary care provider, you can also send messages to your care team and make appointments. If you have questions, please call your primary care clinic.  If you do not have a primary care provider, please call [phone] and they will assist you.      [employer] is an electronic gateway that provides easy, online access to your medical records. With [employer], you can request a clinic appointment, read your test results, renew a prescription or communicate with your care team.     To access your existing account, please contact your [facility] Clinic or call [phone] for assistance.        Care EveryWhere ID     This is your Care EveryWhere ID. This could be used by other organizations to access your [facility] medical records  [id number]         Blood Pressure from Last 3 Encounters:   01/12/17 136/73   05/05/16 120/62   04/30/15 110/64    Weight from Last 3 Encounters:   12/09/16 62.6 kg (138 lb)   05/05/16 62.1 kg (137 lb)   04/30/15 61.2 kg (135 lb)              We Performed the Following     SPEECH/HEARING THERAPY, INDIVIDUAL        Primary Care Provider Office Phone # Fax #    Bao Logan [phone]       [facility] [address] [place] [address]        Thank you!     Thank you for choosing [facility]  for your care. Our goal is always to provide you with excellent care. Hearing back from our patients is one way we can continue to improve our services. Please take a few minutes to complete the written survey that you may receive in the mail after your visit with us. Thank you!             Your Updated Medication List - Protect others around you:  Learn how to safely use, store and throw away your medicines at www.disposemymeds.org.          This list is accurate as of: 3/8/17 11:59 PM.  Always use your most recent med list.                   Brand Name Dispense Instructions for use    CALCIUM 600+D PO      Take  by mouth daily.       cholecalciferol 1000 UNITS capsule    vitamin  -D     Take 1 capsule by mouth daily.       ELIDEL 1 % cream   Generic drug:  pimecrolimus      Apply  topically 2 times daily.       estradiol 0.1 MG/GM cream    ESTRACE VAGINAL    42.5 g    Place 1 g vaginally twice a week       ESTRADIOL 0.1 MG/GM NON-ALCOHOL GEL      Insert 1 gram vaginally once a week       fiber modular packet      3 times daily (with meals)       * fluocinonide 0.05 % ointment    LIDEX         * fluocinonide 0.05 % solution    LIDEX     apply to ears PRN per derm       glucosamine 500 MG Tabs      Take  by mouth daily.       hydrocortisone 0.2 % cream    WESTCORT     Apply  topically 2 times daily.       MUPIROCIN EX      Externally apply  topically.       XALATAN 0.005 % ophthalmic solution   Generic drug:  latanoprost      1 drop At Bedtime.       * Notice:  This list has 2 medication(s) that are the same as other medications prescribed for you. Read the directions carefully, and ask your doctor or other care provider to review them with you.

## 2017-05-11 ENCOUNTER — RADIANT APPOINTMENT (OUTPATIENT)
Dept: MAMMOGRAPHY | Facility: CLINIC | Age: 77
End: 2017-05-11
Payer: COMMERCIAL

## 2017-05-11 ENCOUNTER — OFFICE VISIT (OUTPATIENT)
Dept: OBGYN | Facility: CLINIC | Age: 77
End: 2017-05-11
Payer: COMMERCIAL

## 2017-05-11 VITALS
HEART RATE: 64 BPM | HEIGHT: 65 IN | WEIGHT: 137 LBS | BODY MASS INDEX: 22.82 KG/M2 | SYSTOLIC BLOOD PRESSURE: 128 MMHG | DIASTOLIC BLOOD PRESSURE: 62 MMHG

## 2017-05-11 DIAGNOSIS — Z01.419 ENCOUNTER FOR GYNECOLOGICAL EXAMINATION WITHOUT ABNORMAL FINDING: Primary | ICD-10-CM

## 2017-05-11 DIAGNOSIS — Z12.31 VISIT FOR SCREENING MAMMOGRAM: ICD-10-CM

## 2017-05-11 DIAGNOSIS — N95.2 ATROPHIC VAGINITIS: ICD-10-CM

## 2017-05-11 PROCEDURE — 99397 PER PM REEVAL EST PAT 65+ YR: CPT | Performed by: OBSTETRICS & GYNECOLOGY

## 2017-05-11 PROCEDURE — 77063 BREAST TOMOSYNTHESIS BI: CPT | Mod: TC

## 2017-05-11 PROCEDURE — G0202 SCR MAMMO BI INCL CAD: HCPCS | Mod: TC

## 2017-05-11 RX ORDER — ESTRADIOL 0.1 MG/G
1 CREAM VAGINAL
Qty: 42.5 G | Refills: 2 | Status: SHIPPED | OUTPATIENT
Start: 2017-05-11 | End: 2018-05-17

## 2017-05-11 ASSESSMENT — ANXIETY QUESTIONNAIRES
GAD7 TOTAL SCORE: 0
6. BECOMING EASILY ANNOYED OR IRRITABLE: NOT AT ALL
3. WORRYING TOO MUCH ABOUT DIFFERENT THINGS: NOT AT ALL
2. NOT BEING ABLE TO STOP OR CONTROL WORRYING: NOT AT ALL
1. FEELING NERVOUS, ANXIOUS, OR ON EDGE: NOT AT ALL
5. BEING SO RESTLESS THAT IT IS HARD TO SIT STILL: NOT AT ALL
7. FEELING AFRAID AS IF SOMETHING AWFUL MIGHT HAPPEN: NOT AT ALL
IF YOU CHECKED OFF ANY PROBLEMS ON THIS QUESTIONNAIRE, HOW DIFFICULT HAVE THESE PROBLEMS MADE IT FOR YOU TO DO YOUR WORK, TAKE CARE OF THINGS AT HOME, OR GET ALONG WITH OTHER PEOPLE: NOT DIFFICULT AT ALL

## 2017-05-11 ASSESSMENT — PATIENT HEALTH QUESTIONNAIRE - PHQ9: 5. POOR APPETITE OR OVEREATING: NOT AT ALL

## 2017-05-11 NOTE — PROGRESS NOTES
Kate is a 76 year old  female who presents for annual exam.     Besides routine health maintenance, she has no other health concerns today .    Do you have a Health Care Directive?: Yes: Patient states has Advance Directive and will bring in a copy to clinic.    Fall risk:   Fallen 2 or more times in the past year?: No  Any fall with injury in the past year?: No    HPI:  The patient's PCP is Bao Logan MD.    Dealing with scoliosis. Declines 10 hour surgery for nelson placement. Going to PT.  Has left knee pain as well. Seeing Ortho.  Has hoarseness and getting treatment.  Uses vaginal estrogen sporadically.  Has night time urgency and incontinence occasionally.    GYNECOLOGIC HISTORY:  No LMP recorded. Patient is postmenopausal..   reports that she has never smoked. She has never used smokeless tobacco.    Patient is sexually active.  STD testing offered?  Declined  Last PHQ-9 score on record=   PHQ-9 SCORE 2017   Total Score 0     Last GAD7 score on record=   RENÉE-7 SCORE 2016   Total Score 0 0     Alcohol Score = 2    HEALTH MAINTENANCE:  Cholesterol: 2017 with PCP per patient  Last Mammo: one year ago, Result: normal, Next Mammo: today   Pap: 2013 Neg, HPV-  DEXA:   Spine: -0.8, both Hips: -1.4  Colonoscopy:  10/2014, Result:  normal, Next Colonoscopy: 2019  Health maintenance updated:  yes    HISTORY:  Obstetric History       T2      TAB0   SAB0   E0   M0   L2       # Outcome Date GA Lbr Stuart/2nd Weight Sex Delivery Anes PTL Lv   2 Term            1 Term                 Patient Active Problem List   Diagnosis     Arthritis     Dysphonia     Past Surgical History:   Procedure Laterality Date     ORTHOPEDIC SURGERY  2009    carpal tunnel surgery + new thumb joints     right and left thumb joints        Social History   Substance Use Topics     Smoking status: Never Smoker     Smokeless tobacco: Never Used     Alcohol use 0.0 oz/week      Comment: 1-2 drinks a  "month      Problem (# of Occurrences) Relation (Name,Age of Onset)    CANCER (3) Mother, Father, Brother    Colon Cancer (1) Unspecified    Depression (1) Mother    Hypertension (1) Mother    Lung Cancer (2) Father, Brother            Current Outpatient Prescriptions   Medication Sig     fluocinonide (LIDEX) 0.05 % ointment      fluocinonide (LIDEX) 0.05 % solution apply to ears PRN per derm     fiber modular (NUTRISOURCE FIBER) packet 3 times daily (with meals)     estradiol (ESTRACE VAGINAL) 0.1 MG/GM vaginal cream Place 1 g vaginally twice a week     Calcium Carbonate-Vitamin D (CALCIUM 600+D PO) Take  by mouth daily.     cholecalciferol (VITAMIN  -D) 1000 UNITS capsule Take 1 capsule by mouth daily.     glucosamine 500 MG TABS Take  by mouth daily.     hydrocortisone (WESTCORT) 0.2 % cream Apply  topically 2 times daily.     latanoprost (XALATAN) 0.005 % ophthalmic solution 1 drop At Bedtime.     MUPIROCIN EX Externally apply  topically.     pimecrolimus (ELIDEL) 1 % cream Apply  topically 2 times daily.     No current facility-administered medications for this visit.        Allergies   Allergen Reactions     Bacitracin        Past medical, surgical, social and family history were reviewed and updated in EPIC.    ROS:   12 point review of systems negative other than symptoms noted below.  Genitourinary: Painful [place] and Vaginal Dryness  Skin: Rash  Musculoskeletal: Height Loss and Joint Pain  Endocrine: Cold Intolerance and Loss of Hair    EXAM:  Ht 5' 4.5\" (1.638 m)  Wt 137 lb (62.1 kg)  BMI 23.15 kg/m2   BMI: Body mass index is 23.15 kg/(m^2).    EXAM:  Constitutional: Appearance: Well nourished, well developed alert, in no acute distress  Neck:  Lymph Nodes:  No lymphadenopathy present    Thyroid:  Gland size normal, nontender, no nodules or masses present  on palpation  Chest:  Respiratory Effort:  Breathing unlabored  Cardiovascular:Heart    Auscultation:  Regular rate, normal rhythm, no murmurs " present  Breasts: Inspection of Breasts:  No lymphadenopathy present    Palpation of Breasts and Axillae:  No masses present on palpation, no  breast tenderness    Axillary Lymph Nodes:  No lymphadenopathy present  Gastrointestinal:  Abdominal Examination:  Abdomen nontender to palpation, tone normal without     rigidity or guarding, no masses present, umbilicus without lesions    Liver and speen:  No hepatomegaly present, liver nontender to palpation    Hernias:  No hernias present  Lymphatic: Lymph Nodes:  No other lymphadenopathy present  Skin:  General Inspection:  No rashes present, no lesions present, no areas of  discoloration.    Genitalia and Groin:  No rashes present, no lesions present, no areas of  discoloration, no masses present  Neurologic/Psychiatric:    Mental Status:  Oriented X3     Pelvic Exam:  External Genitalia:     Normal appearance for age, no discharge present, no tenderness present, no inflammatory lesions present, color normal  Vagina:     Normal vaginal vault without central or paravaginal defects, ATROPHIC  Bladder:     Nontender to palpation  Urethra:   Urethral Body:  Urethra palpation normal, urethra structural support normal   Urethral Meatus:  No erythema or lesions present  Cervix:     Appearance healthy, no lesions present, nontender to palpation, no bleeding present  Uterus:     Nontender to palpation, no masses present, position anteflexed, mobility: normal  Adnexa:     No adnexal tenderness present, no adnexal masses present  Perineum:     Perineum within normal limits, no evidence of trauma, no rashes or skin lesions present  Inguinal Lymph Nodes:     No lymphadenopathy present      COUNSELING:   Reviewed preventive health counseling, as reflected in patient instructions       Regular exercise    BMI:  Body mass index is 23.15 kg/(m^2).     reports that she has never smoked. She has never used smokeless tobacco.      ASSESSMENT:  76 year old female with satisfactory annual  exam.    ICD-10-CM    1. Encounter for gynecological examination without abnormal finding Z01.419        PLAN:  Discussed urgency and incontinence. Can increase usage of vaginal estrogen or consider night time Detrol short acting    Jorje Reyes MD

## 2017-05-11 NOTE — MR AVS SNAPSHOT
After Visit Summary   5/11/2017    Kate Lima    MRN: [id number]           Patient Information     Date Of Birth          1940        Visit Information        Provider Department      5/11/2017 9:00 AM Jorje Reyes MD [facility]        Today's Diagnoses     Encounter for gynecological examination without abnormal finding    -  1    Atrophic vaginitis           Follow-ups after your visit        Your next 10 appointments already scheduled     May 31, 2017  9:00 AM CDT   (Arrive by 8:45 AM)   RETURN SLP VOICE with MARY ANNE [facility]   [facility] ([facility])    [address] [place] [address] [phone]            Jun 14, 2017  9:00 AM CDT   (Arrive by 8:45 AM)   RETURN SLP VOICE with MARY ANNE Gannon   [facility] ([facility])    [address] [place] [address] [phone]              Who to contact     If you have questions or need follow up information about today's clinic visit or your schedule please contact [facility] directly at [phone].  Normal or non-critical lab and imaging results will be communicated to you by MyChart, letter or phone within 4 business days after the clinic has received the results. If you do not hear from us within 7 days, please contact the clinic through MyChart or phone. If you have a critical or abnormal lab result, we will notify you by phone as soon as possible.  Submit refill requests through [employer] or call your pharmacy and they will forward the refill request to us. Please allow 3 business days for your refill to be completed.          Additional Information About Your Visit        MyChart Information     [employer] gives you secure access to your electronic health record. If you see a primary care provider, you can also send messages to your care team and make appointments. If you  "have questions, please call your primary care clinic.  If you do not have a primary care provider, please call [phone] and they will assist you.        Care EveryWhere ID     This is your Care EveryWhere ID. This could be used by other organizations to access your [facility] medical records  [id number]        Your Vitals Were     Pulse Height BMI (Body Mass Index)             64 5' 4.5\" (1.638 m) 23.15 kg/m2          Blood Pressure from Last 3 Encounters:   05/11/17 128/62   01/12/17 136/73   05/05/16 120/62    Weight from Last 3 Encounters:   05/11/17 137 lb (62.1 kg)   12/09/16 138 lb (62.6 kg)   05/05/16 137 lb (62.1 kg)              Today, you had the following     No orders found for display         Today's Medication Changes          These changes are accurate as of: 5/11/17  9:48 AM.  If you have any questions, ask your nurse or doctor.               These medicines have changed or have updated prescriptions.        Dose/Directions    estradiol 0.1 MG/GM cream   Commonly known as:  ESTRACE VAGINAL   This may have changed:  additional instructions   Used for:  Atrophic vaginitis   Changed by:  Jorje Reyes MD        Dose:  1 g   Place 1 g vaginally twice a week Pt will call for Rx. Don't fill yet   Quantity:  42.5 g   Refills:  2            Where to get your medicines      These medications were sent to [facility] [address] [place] [address] [address] [facility]  [facility] [address] [facility] [address]     Phone:  [phone]     estradiol 0.1 MG/GM cream                Primary Care Provider Office Phone # Fax #    Bao Logan [phone]       [facility] [address] [address]        Thank you!     Thank you for choosing [facility] [place]  for your care. Our goal is always to provide you with excellent care. Hearing back from our patients is one way we can continue to improve our services. Please take a few " minutes to complete the written survey that you may receive in the mail after your visit with us. Thank you!             Your Updated Medication List - Protect others around you: Learn how to safely use, store and throw away your medicines at www.disposemymeds.org.          This list is accurate as of: 5/11/17  9:48 AM.  Always use your most recent med list.                   Brand Name Dispense Instructions for use    CALCIUM 600+D PO      Take  by mouth daily.       cholecalciferol 1000 UNITS capsule    vitamin  -D     Take 1 capsule by mouth daily.       ELIDEL 1 % cream   Generic drug:  pimecrolimus      Apply  topically 2 times daily.       estradiol 0.1 MG/GM cream    ESTRACE VAGINAL    42.5 g    Place 1 g vaginally twice a week Pt will call for Rx. Don't fill yet       fiber modular packet      3 times daily (with meals)       * fluocinonide 0.05 % ointment    LIDEX         * fluocinonide 0.05 % solution    LIDEX     apply to ears PRN per derm       glucosamine 500 MG Tabs      Take  by mouth daily.       hydrocortisone 0.2 % cream    WESTCORT     Apply  topically 2 times daily.       MUPIROCIN EX      Externally apply  topically.       XALATAN 0.005 % ophthalmic solution   Generic drug:  latanoprost      1 drop At Bedtime.       * Notice:  This list has 2 medication(s) that are the same as other medications prescribed for you. Read the directions carefully, and ask your doctor or other care provider to review them with you.

## 2017-05-12 ASSESSMENT — PATIENT HEALTH QUESTIONNAIRE - PHQ9: SUM OF ALL RESPONSES TO PHQ QUESTIONS 1-9: 0

## 2017-05-12 ASSESSMENT — ANXIETY QUESTIONNAIRES: GAD7 TOTAL SCORE: 0

## 2017-05-31 ENCOUNTER — OFFICE VISIT (OUTPATIENT)
Dept: OTOLARYNGOLOGY | Facility: CLINIC | Age: 77
End: 2017-05-31

## 2017-05-31 DIAGNOSIS — R49.0 DYSPHONIA: Primary | ICD-10-CM

## 2017-05-31 NOTE — PROGRESS NOTES
"    After Visit Summary    Patient: Kate Lima  Date of Visit: 5/31/2017    Breathing:    Breathing Tips:  o Breathe before all speech - and take your time in-between phrases and words    Be careful to avoid shallow breathing.    Voice:    Bubbles (straw in 1.5 to 2  of water):  o blow 10-15 seconds with no voice and keep bubbles consistent.  o 3x: blow bubbles and add a sustained  who  or an  oo  (comfortable pitch ), Then move straw to the top of the water, Finally out of the water using the straw only.  o 3x: blow bubbles and vary  who  gliding up and down             Up and down like a sine wave  o   These exercises are great for:    *warm up / cool down - Part of the morning routing and before and after extended voice use.    *tissue mobilization exercise - Improving the condition and pliability of the vocal folds.    *Abdominal breathing and applying optimal breath flow to speech/singing.       M+ vowels: Hold the MMMMMMMMMMMMMMMMMMMMMMMMMMMMM_ah (complete each vowel combination 2x).     m  words  o Pick 5 words, or say \"made\" 5 times.     m  sentences  o Complete 5 phrases.  Hold the first MMMMM_eet my mom    Blending phrases - 1st column, and the longer phrases    *Try to let the jaw relax, and use your hands on your cheeks.    Charito Parker M.M. (voice), M.A., CCC/SLP  Speech-Language Pathologist  [facility]  [phone]          "

## 2017-05-31 NOTE — LETTER
5/31/2017       RE: Kate Lima  [address] [id number]     Dear Colleague,    Thank you for referring your patient, Kate Lima, to the [facility]. Please see a copy of my visit note below.    [facility]  Franklin Merlos Jr., M.D., F.A.C.S.  Almaz Lafleur M.D., M.P.H.  Jolly George, Ph.D., CCC/SLP  Charito Parker M.M. (voice), M.A., CCC/SLP  Aguila Almaguer M.M. (voice), M.A., [facility]/SLP    [facility]  VOICE/SPEECH/BREATHING THERAPY PROGRESS REPORT    Patient: Kate Lima  Date of Service: 5/31/2017    PROGRESS SINCE LAST SESSION  Ms. Lima was last seen on 3/8/17.  At that time, she worked on learning therapeutic activities to improve her voice quality and comfort, secondary to dysphonia; allowing  her to meet personal and professional vocal demands and fully engage in activities of daily living.     Regarding practice, Ms. Lima reports the following:     irregular practice     the recording is not helpful; however, I stressed its importance for improved accuracy during practice today.    The after visit summary is helpful to facilitate practice.    voice is mildly improved during the exercises, but minimal carryover to spontaneous conversation    Ms. Lima also states that:    Sipping water    Voice quality remains rough to her; she feels worse than typical    Continues to manage several health issues, which makes it difficult to maintain regular practice    She and her  would like for her to have more volume/ projection while speaking.     Ms. Lima presents today with the following:    Continues to experience chronic dry mouth    Breathing pattern:    Inspirations are often inadequate for speech in volume and frequency    Abdominal muscle use pattern    Shoulder and neck involvement    Shallow    Abdominal contraction at initiation of phonation    Phonation is not coordinated with  respiration    Tension is evident:    Jaw - very tight; working on stretching to 2 fingers    Tongue base    Neck and shoulders    Voice quality is characterized by    Moderate to severe roughness    Mild breathiness and strain    Habitual pitch is G3    Laryngeal/ pharyngeal focus with speech    THERAPEUTIC ACTIVITIES  Today Ms. Lima participated in the following therapeutic activities:    I recommended a xylitol gum to help improve oral moisture.    Demonstrated previous exercises.  o demonstrated improved technique  o appropriate redirection provided  o instruction provided for increased level of complexity/difficulty    Exercises for optimal respiratory mechanics for speech and singing.  o with guidance, learned improved abdominal relaxation for inhalation  o learned techniques for optimal airflow on exhalation  o adequate improvement in airflow and respiratory mechanics with moderate verbal and visual cues    Exercises to add phonation to the optimal flowing airstream.  o Semi-occluded vocal tract exercises with straw phonation and water resistance were most facilitating  o Gradually progressed out of the water today, while maintaining the same resonance.  She noted a clearer, improved voice quality, but it required some mental effort to maintain a more appropriate focus; possible voice identity concerns.  o Instructed to use as a voice warm up, cool down, coordination of breath flow with phonation, and for tissue mobilization.  o good learning, but will need practice     [place] exercises for improved airflow during phonation.  o speech material with blending was facilitating  o Instructed at the phrase level.  o learned techniques to reduce glottal charlton and improve breath flow    [place] exercises to experience a more forward sensation during phonation.  o speech material with nasal continuants was facilitating  o speech material that elicits a high, forward tongue position was facilitating  o able to  recognize improvement in quality and comfort  o able to progress from M+ vowel combinations, to the word and phrase level; this was helpful, and slightly more facilitating than /n/ loaded words and phrases today.  o Moderate cues to help improve coordination between breath flow and speech; tends to rush  o I encouraged her to take her time with the exercises, as they can also be helpful for learning how to improve projection.  o adequate learning, but will need practice    [place] concepts of an optimal regimen for practice.  o she should use an interval schedule of practice, with brief periods of practice frequently throughout each day  o [place] concepts of volitional practice to facilitate motor learning.    I provided an after visit summary to help facilitate practice.    IMPRESSIONS/GOALS/PLAN  Ms. Lima had a productive session of therapy today, working on techniques/strategies/exercises that will help her achieve her goal of acceptable and comfortable voice use, secondary to dysphonia; allowing her to meet personal and professional vocal demands and fully engage in activities of daily living.  Today, she demonstrated difficulty achieving and maintaining an optimal voice quality with the techniques instructed.  She also has difficulty hearing the subtle changes (positive and negative) in her voice quality, although she is aware that her voice quality is poor.  We agreed to continue her course of therapy; I will look into extending her Medicare benefits to support additional therapy sessions.     Progress toward long-term goals:   Adequate progress; too early for objective measures     Goals for this practice period:     practice all exercises according to instructions    incorporate techniques into daily vocal activities    maintain vigilance for vocal technique     Plan: I will see Ms. Lima in 2 weeks to work on education, modification, and carryover of therapeutic activities to more complex phonatory  "tasks.     PRIMARY ICD-10 code: R49.0 (Dysphonia)     TOTAL SERVICE TIME: 60 minutes  TREATMENT (15659): 60 minutes  NO CHARGE FACILITY FEE (99844)     Charito Parker M.M. (voice), M.A., CCC/SLP  Speech-Language Pathologist  [facility]  [phone]              After Visit Summary    Patient: Kate Lima  Date of Visit: 5/31/2017    Breathing:    Breathing Tips:  o Breathe before all speech - and take your time in-between phrases and words    Be careful to avoid shallow breathing.    Voice:    Bubbles (straw in 1.5 to 2  of water):  o blow 10-15 seconds with no voice and keep bubbles consistent.  o 3x: blow bubbles and add a sustained  who  or an  oo  (comfortable pitch ), Then move straw to the top of the water, Finally out of the water using the straw only.  o 3x: blow bubbles and vary  who  gliding up and down             Up and down like a sine wave  o   These exercises are great for:    *warm up / cool down - Part of the morning routing and before and after extended voice use.    *tissue mobilization exercise - Improving the condition and pliability of the vocal folds.    *Abdominal breathing and applying optimal breath flow to speech/singing.       M+ vowels: Hold the MMMMMMMMMMMMMMMMMMMMMMMMMMMMM_ah (complete each vowel combination 2x).     m  words  o Pick 5 words, or say \"made\" 5 times.     m  sentences  o Complete 5 phrases.  Hold the first MMMMM_eet my mom    Blending phrases - 1st column, and the longer phrases    *Try to let the jaw relax, and use your hands on your cheeks.    Charito Parker M.M. (voice), M.A., CCC/SLP  Speech-Language Pathologist  [facility]  [phone]            "

## 2017-05-31 NOTE — MR AVS SNAPSHOT
After Visit Summary   5/31/2017    Kate Lima    MRN: [id number]           Patient Information     Date Of Birth          1940        Visit Information        Provider Department      5/31/2017 9:00 AM Charito Parker SLP [facility]        Today's Diagnoses     Dysphonia    -  1       Follow-ups after your visit        Your next 10 appointments already scheduled     Jun 14, 2017  9:00 AM CDT   (Arrive by 8:45 AM)   RETURN SLP VOICE with [facility] ([facility])    [address] [place] [id number] [phone]              Who to contact     Please call your clinic at [phone] to:    Ask questions about your health    Make or cancel appointments    Discuss your medicines    Learn about your test results    Speak to your doctor   If you have compliments or concerns about an experience at your clinic, or if you wish to file a complaint, please contact [facility] Patient Relations at [phone] or email us at [email or web address]         Additional Information About Your Visit        MyChart Information     [employer] gives you secure access to your electronic health record. If you see a primary care provider, you can also send messages to your care team and make appointments. If you have questions, please call your primary care clinic.  If you do not have a primary care provider, please call [phone] and they will assist you.      [employer] is an electronic gateway that provides easy, online access to your medical records. With [employer], you can request a clinic appointment, read your test results, renew a prescription or communicate with your care team.     To access your existing account, please contact your [facility] or call [phone] for assistance.        Care EveryWhere ID     This is your Care EveryWhere ID. This could be used by  other organizations to access your [place] medical records  [id number]         Blood Pressure from Last 3 Encounters:   05/11/17 128/62   01/12/17 136/73   05/05/16 120/62    Weight from Last 3 Encounters:   05/11/17 62.1 kg (137 lb)   12/09/16 62.6 kg (138 lb)   05/05/16 62.1 kg (137 lb)              We Performed the Following     SPEECH/HEARING THERAPY, INDIVIDUAL        Primary Care Provider Office Phone # Fax #    Bao Logan [phone]       [facility] [address] [address]        Thank you!     Thank you for choosing [facility]  for your care. Our goal is always to provide you with excellent care. Hearing back from our patients is one way we can continue to improve our services. Please take a few minutes to complete the written survey that you may receive in the mail after your visit with us. Thank you!             Your Updated Medication List - Protect others around you: Learn how to safely use, store and throw away your medicines at www.disposemymeds.org.          This list is accurate as of: 5/31/17  1:56 PM.  Always use your most recent med list.                   Brand Name Dispense Instructions for use    CALCIUM 600+D PO      Take  by mouth daily.       cholecalciferol 1000 UNITS capsule    vitamin  -D     Take 1 capsule by mouth daily.       ELIDEL 1 % cream   Generic drug:  pimecrolimus      Apply  topically 2 times daily.       estradiol 0.1 MG/GM cream    ESTRACE VAGINAL    42.5 g    Place 1 g vaginally twice a week Pt will call for Rx. Don't fill yet       fiber modular packet      3 times daily (with meals)       * fluocinonide 0.05 % ointment    LIDEX         * fluocinonide 0.05 % solution    LIDEX     apply to ears PRN per derm       glucosamine 500 MG Tabs      Take  by mouth daily.       hydrocortisone 0.2 % cream    WESTCORT     Apply  topically 2 times daily.       MUPIROCIN EX      Externally apply  topically.       XALATAN 0.005 %  ophthalmic solution   Generic drug:  latanoprost      1 drop At Bedtime.       * Notice:  This list has 2 medication(s) that are the same as other medications prescribed for you. Read the directions carefully, and ask your doctor or other care provider to review them with you.

## 2017-05-31 NOTE — PROGRESS NOTES
[facility] VOICE [facility]  Franklin Merlos Jr., M.D., F.A.C.S.  Almaz Lafleur M.D., M.P.H.  Jolly George, Ph.D., CCC/SLP  Charito Parker M.M. (voice), M.A., CCC/SLP  Aguila Almaguer M.M. (voice), M.A., [facility]/SLP    [facility]  VOICE/SPEECH/BREATHING THERAPY PROGRESS REPORT    Patient: Kate Lima  Date of Service: 5/31/2017    PROGRESS SINCE LAST SESSION  Ms. Lima was last seen on 3/8/17.  At that time, she worked on learning therapeutic activities to improve her voice quality and comfort, secondary to dysphonia; allowing  her to meet personal and professional vocal demands and fully engage in activities of daily living.     Regarding practice, Ms. Lima reports the following:     irregular practice     the recording is not helpful; however, I stressed its importance for improved accuracy during practice today.    The after visit summary is helpful to facilitate practice.    voice is mildly improved during the exercises, but minimal carryover to spontaneous conversation    Ms. Lima also states that:    Sipping water    Voice quality remains rough to her; she feels worse than typical    Continues to manage several health issues, which makes it difficult to maintain regular practice    She and her  would like for her to have more volume/ projection while speaking.     Ms. Lima presents today with the following:    Continues to experience chronic dry mouth    Breathing pattern:    Inspirations are often inadequate for speech in volume and frequency    Abdominal muscle use pattern    Shoulder and neck involvement    Shallow    Abdominal contraction at initiation of phonation    Phonation is not coordinated with respiration    Tension is evident:    Jaw - very tight; working on stretching to 2 fingers    Tongue base    Neck and shoulders    Voice quality is characterized by    Moderate to severe roughness    Mild breathiness and strain    Habitual pitch is G3    Laryngeal/ pharyngeal focus  with speech    THERAPEUTIC ACTIVITIES  Today Ms. Lima participated in the following therapeutic activities:    I recommended a xylitol gum to help improve oral moisture.    Demonstrated previous exercises.  o demonstrated improved technique  o appropriate redirection provided  o instruction provided for increased level of complexity/difficulty    Exercises for optimal respiratory mechanics for speech and singing.  o with guidance, learned improved abdominal relaxation for inhalation  o learned techniques for optimal airflow on exhalation  o adequate improvement in airflow and respiratory mechanics with moderate verbal and visual cues    Exercises to add phonation to the optimal flowing airstream.  o Semi-occluded vocal tract exercises with straw phonation and water resistance were most facilitating  o Gradually progressed out of the water today, while maintaining the same resonance.  She noted a clearer, improved voice quality, but it required some mental effort to maintain a more appropriate focus; possible voice identity concerns.  o Instructed to use as a voice warm up, cool down, coordination of breath flow with phonation, and for tissue mobilization.  o good learning, but will need practice     [place] exercises for improved airflow during phonation.  o speech material with blending was facilitating  o Instructed at the phrase level.  o learned techniques to reduce glottal charlton and improve breath flow    [place] exercises to experience a more forward sensation during phonation.  o speech material with nasal continuants was facilitating  o speech material that elicits a high, forward tongue position was facilitating  o able to recognize improvement in quality and comfort  o able to progress from M+ vowel combinations, to the word and phrase level; this was helpful, and slightly more facilitating than /n/ loaded words and phrases today.  o Moderate cues to help improve coordination between breath flow and  speech; tends to rush  o I encouraged her to take her time with the exercises, as they can also be helpful for learning how to improve projection.  o adequate learning, but will need practice    [place] concepts of an optimal regimen for practice.  o she should use an interval schedule of practice, with brief periods of practice frequently throughout each day  o [place] concepts of volitional practice to facilitate motor learning.    I provided an after visit summary to help facilitate practice.    IMPRESSIONS/GOALS/PLAN  Ms. Lima had a productive session of therapy today, working on techniques/strategies/exercises that will help her achieve her goal of acceptable and comfortable voice use, secondary to dysphonia; allowing her to meet personal and professional vocal demands and fully engage in activities of daily living.  Today, she demonstrated difficulty achieving and maintaining an optimal voice quality with the techniques instructed.  She also has difficulty hearing the subtle changes (positive and negative) in her voice quality, although she is aware that her voice quality is poor.  We agreed to continue her course of therapy; I will look into extending her Medicare benefits to support additional therapy sessions.     Progress toward long-term goals:   Adequate progress; too early for objective measures     Goals for this practice period:     practice all exercises according to instructions    incorporate techniques into daily vocal activities    maintain vigilance for vocal technique     Plan: I will see Ms. Lima in 2 weeks to work on education, modification, and carryover of therapeutic activities to more complex phonatory tasks.     PRIMARY ICD-10 code: R49.0 (Dysphonia)     TOTAL SERVICE TIME: 60 minutes  TREATMENT (21925): 60 minutes  NO CHARGE FACILITY FEE (71094)     Charito Parker M.M. (voice), M.A., CCC/SLP  Speech-Language Pathologist  [facility]  [phone]

## 2017-06-14 ENCOUNTER — OFFICE VISIT (OUTPATIENT)
Dept: OTOLARYNGOLOGY | Facility: CLINIC | Age: 77
End: 2017-06-14

## 2017-06-14 DIAGNOSIS — R49.0 DYSPHONIA: Primary | ICD-10-CM

## 2017-06-14 NOTE — PROGRESS NOTES
[facility] VOICE [facility]  Franklin Merlos Jr., M.D., F.A.C.S.  Almaz Lafleur M.D., M.P.H.  Jolly George, Ph.D., CCC/SLP  Charito Parker M.M. (voice), M.A., CCC/SLP  Aguila Almaguer M.M. (voice), M.A., [facility]/SLP    [facility] VOICE [facility]  VOICE/SPEECH/BREATHING THERAPY PROGRESS REPORT    Patient: Kate Lima  Date of Service: 6/14/2017    PROGRESS SINCE LAST SESSION  Ms. Lima was last seen on 5/31.  At that time, she worked on learning therapeutic activities to improve her voice quality and comfort, secondary to dysphonia; allowing  her to meet personal and professional vocal demands and fully engage in activities of daily living.    Regarding practice, Ms. Lima reports the following:     Irregular practice with and without the recording    Does not feel a recording would be helpful.    The after visit summary is helpful to facilitate practice.    voice is improved during the exercises, but minimal carryover to spontaneous conversation    she experiences improvement in fatigue and discomfort while doing the exercises    Ms. Lima also states that:    Sipping water continues to be helpful, and she has also been working on improving oral hygiene and hydration    Voice quality remains rough to her; she reports that today is a typical day.    Continues to manage several health issues, which makes it difficult to maintain regular practice    She and her  would like for her to have more volume/ projection while speaking.      Ms. Lima presents today with the following:    Continues to experience chronic dry mouth    Breathing pattern:    Inspirations are often inadequate for speech in volume and frequency    Abdominal muscle use pattern    Shoulder and neck involvement    Shallow    Abdominal contraction at initiation of phonation    Phonation is not coordinated with respiration    Tension is evident:    Jaw - very tight; working on stretching to 2 fingers    Tongue base    Neck and shoulders    Voice  "quality is characterized by    Moderate to severe roughness    Mild breathiness and strain    Habitual pitch is G3    Laryngeal/ pharyngeal focus with speech    THERAPEUTIC ACTIVITIES  Today Ms. Lima participated in the following therapeutic activities:    Demonstrated previous exercises.    demonstrated improved technique    appropriate redirection provided    instruction provided for increased level of complexity/difficulty    Exercises for optimal respiratory mechanics for speech and singing.    with guidance, learned improved abdominal relaxation for inhalation    learned techniques for optimal airflow on exhalation    adequate improvement in airflow and respiratory mechanics with moderate verbal and visual cues    She reports that some of her shallow breathing may be related to her scoliosis and loss of 3\" of height, which may be compressing her lungs.    Exercises to add phonation to the optimal flowing airstream.    Semi-occluded vocal tract exercises with straw phonation and water resistance were most facilitating    Gradually progressed out of the water today, while maintaining the same resonance.  She noted a clearer, improved voice quality, but it required some mental effort to maintain a more appropriate focus; possible voice identity concerns.    [place] to use a forward focus while completing this exercise; this was helpful, and she noted an improvement in quality and volume today.    Instructed to use as a voice warm up, cool down, coordination of breath flow with phonation, and for tissue mobilization.    good learning, but will need practice     Exercises for improved airflow during phonation.    speech material with blending was facilitating    Instructed at the phrase level.    learned techniques to reduce glottal charlton and improve breath flow    Exercises to experience a more forward sensation during phonation.    speech material with nasal continuants was facilitating    speech material that " elicits a high, forward tongue position was facilitating    able to recognize improvement in quality and comfort    able to progress from M+ vowel combinations, to the word and phrase level; this was helpful, and slightly more facilitating than /n/ loaded words and phrases today.    Progressed to the paragraph level today with /m/ loaded paragraph; this was mild to moderately successful with moderate verbal cues and modeling by SLP.    [place] to apply strategies to maintain a more relaxed jaw, this was helpful.    Moderate cues to help improve coordination between breath flow and speech; tends to rush    I encouraged her to take her time with the exercises, as they can also be helpful for learning how to improve projection.    adequate learning, but will need practice    [place] concepts of an optimal regimen for practice.    she should use an interval schedule of practice, with brief periods of practice frequently throughout each day    [place] concepts of volitional practice to facilitate motor learning.    I provided an after visit summary to help facilitate practice.    IMPRESSIONS/GOALS/PLAN  Ms. Lima had a productive session of therapy today, working on techniques/strategies/exercises that will help her achieve her goal of acceptable and comfortable voice use, secondary to dysphonia; allowing her to meet personal and professional vocal demands and fully engage in activities of daily living.  Today, she demonstrated difficulty achieving and maintaining an optimal voice quality with the techniques instructed.  She also has difficulty hearing the subtle changes (positive and negative) in her voice quality, although she is aware that her voice quality is poor.  We agreed to continue her course of therapy; I will look into extending her Medicare benefits to support additional therapy sessions.    Progress toward long-term goals:   Adequate progress; too early for objective measures    Goals for this practice  period:     practice all exercises according to instructions    incorporate techniques into daily vocal activities    maintain vigilance for vocal technique    Plan: I will see Ms. Lima in 4 weeks to work on education, modification, and carryover of therapeutic activities to more complex phonatory tasks.  We discussed her progress, and she would like to continue with monthly follow up sessions at this time.    PRIMARY ICD-10 code: R49.0 (Dysphonia)    TOTAL SERVICE TIME: 60 minutes  TREATMENT (00424): 60 minutes  NO CHARGE FACILITY FEE (25198)    Charito Parker M.M. (voice), M.A., CCC/SLP  Speech-Language Pathologist  [facility]  [phone]

## 2017-06-14 NOTE — LETTER
6/14/2017       RE: Kate Lima  [address] [address]     Dear Colleague,    Thank you for referring your patient, Kate Lima, to the [facility]. Please see a copy of my visit note below.    [facility]  Franklin Merlos Jr., M.D., F.A.C.S.  Almaz Lafleur M.D., M.P.H.  Jolly George, Ph.D., CCC/SLP  Charito Parker M.M. (voice), M.JEVON., CCC/SLP  Aguila Almaguer M.M. (voice), M.A., [facility]/SLP    [facility]  VOICE/SPEECH/BREATHING THERAPY PROGRESS REPORT    Patient: Kate Lima  Date of Service: 6/14/2017    PROGRESS SINCE LAST SESSION  Ms. Lima was last seen on 5/31.  At that time, she worked on learning therapeutic activities to improve her voice quality and comfort, secondary to dysphonia; allowing  her to meet personal and professional vocal demands and fully engage in activities of daily living.    Regarding practice, Ms. Lima reports the following:     Irregular practice with and without the recording    Does not feel a recording would be helpful.    The after visit summary is helpful to facilitate practice.    voice is improved during the exercises, but minimal carryover to spontaneous conversation    she experiences improvement in fatigue and discomfort while doing the exercises    Ms. Lima also states that:    Sipping water continues to be helpful, and she has also been working on improving oral hygiene and hydration    Voice quality remains rough to her; she reports that today is a typical day.    Continues to manage several health issues, which makes it difficult to maintain regular practice    She and her  would like for her to have more volume/ projection while speaking.      Ms. Lima presents today with the following:    Continues to experience chronic dry mouth    Breathing pattern:    Inspirations are often inadequate for speech in volume and frequency    Abdominal muscle use  "pattern    Shoulder and neck involvement    Shallow    Abdominal contraction at initiation of phonation    Phonation is not coordinated with respiration    Tension is evident:    Jaw - very tight; working on stretching to 2 fingers    Tongue base    Neck and shoulders    Voice quality is characterized by    Moderate to severe roughness    Mild breathiness and strain    Habitual pitch is G3    Laryngeal/ pharyngeal focus with speech    THERAPEUTIC ACTIVITIES  Today Ms. Lima participated in the following therapeutic activities:    Demonstrated previous exercises.    demonstrated improved technique    appropriate redirection provided    instruction provided for increased level of complexity/difficulty    Exercises for optimal respiratory mechanics for speech and singing.    with guidance, learned improved abdominal relaxation for inhalation    learned techniques for optimal airflow on exhalation    adequate improvement in airflow and respiratory mechanics with moderate verbal and visual cues    She reports that some of her shallow breathing may be related to her scoliosis and loss of 3\" of height, which may be compressing her lungs.    Exercises to add phonation to the optimal flowing airstream.    Semi-occluded vocal tract exercises with straw phonation and water resistance were most facilitating    Gradually progressed out of the water today, while maintaining the same resonance.  She noted a clearer, improved voice quality, but it required some mental effort to maintain a more appropriate focus; possible voice identity concerns.    [place] to use a forward focus while completing this exercise; this was helpful, and she noted an improvement in quality and volume today.    Instructed to use as a voice warm up, cool down, coordination of breath flow with phonation, and for tissue mobilization.    good learning, but will need practice     Exercises for improved airflow during phonation.    speech material with " blending was facilitating    Instructed at the phrase level.    learned techniques to reduce glottal charlton and improve breath flow    Exercises to experience a more forward sensation during phonation.    speech material with nasal continuants was facilitating    speech material that elicits a high, forward tongue position was facilitating    able to recognize improvement in quality and comfort    able to progress from M+ vowel combinations, to the word and phrase level; this was helpful, and slightly more facilitating than /n/ loaded words and phrases today.    Progressed to the paragraph level today with /m/ loaded paragraph; this was mild to moderately successful with moderate verbal cues and modeling by SLP.    [place] to apply strategies to maintain a more relaxed jaw, this was helpful.    Moderate cues to help improve coordination between breath flow and speech; tends to rush    I encouraged her to take her time with the exercises, as they can also be helpful for learning how to improve projection.    adequate learning, but will need practice    [place] concepts of an optimal regimen for practice.    she should use an interval schedule of practice, with brief periods of practice frequently throughout each day    [place] concepts of volitional practice to facilitate motor learning.    I provided an after visit summary to help facilitate practice.    IMPRESSIONS/GOALS/PLAN  Ms. Lima had a productive session of therapy today, working on techniques/strategies/exercises that will help her achieve her goal of acceptable and comfortable voice use, secondary to dysphonia; allowing her to meet personal and professional vocal demands and fully engage in activities of daily living.  Today, she demonstrated difficulty achieving and maintaining an optimal voice quality with the techniques instructed.  She also has difficulty hearing the subtle changes (positive and negative) in her voice quality, although she is aware that  her voice quality is poor.  We agreed to continue her course of therapy; I will look into extending her Medicare benefits to support additional therapy sessions.    Progress toward long-term goals:   Adequate progress; too early for objective measures    Goals for this practice period:     practice all exercises according to instructions    incorporate techniques into daily vocal activities    maintain vigilance for vocal technique    Plan: I will see Ms. Lima in 4 weeks to work on education, modification, and carryover of therapeutic activities to more complex phonatory tasks.  We discussed her progress, and she would like to continue with monthly follow up sessions at this time.    PRIMARY ICD-10 code: R49.0 (Dysphonia)    TOTAL SERVICE TIME: 60 minutes  TREATMENT (78718): 60 minutes  NO CHARGE FACILITY FEE (90448)    Charito Parker M.M. (voice) MJOANN, CCC/SLP  Speech-Language Pathologist  [facility]  [phone]        After Visit Summary    Patient: Kate Lima  Date of Visit: 6/14/2017    Breathing:    Breathing Tips:    Breathe before all speech - and take your time in-between phrases and words                                              Be careful to avoid shallow breathing.     Voice:    Bubbles (straw in 1.5 to 2  of water):    blow 10-15 seconds with no voice and keep bubbles consistent.    3x: blow bubbles and add a sustained  who  or an  oo  (comfortable pitch ), Then move straw to the top of the water, Finally out of the water using the straw only.    3x: blow bubbles and vary  who  gliding up and down             Up and down like a sine wave       These exercises are great for:                                                    *warm up / cool down - Part of the morning routing and before and after extended voice use.                                                    *tissue mobilization exercise - Improving the condition and pliability of the vocal folds.                                 "                    *Abdominal breathing and applying optimal breath flow to speech/singing.      \"Talk through your nose\"  - maintain a bright forward focus.    2-3X/DAY PRACTICE:     M+ vowels: Hold the MMMMMMMMMMMMMMMMMMMMMMMMMMMMM_ah (complete each vowel combination 2x).     m  words    Pick 5 words, or say \"made\" 5 times.     m  sentences    Complete 5 phrases.  Hold the first MMMMM_eet my mom  -  n  sentences    First 10 sentences, skip #5    Blending phrases - 1st column, and the longer phrases        [place] (1-2X./DAY)      *Try to let the jaw relax, and use your hands on your cheeks.     Charito Parker M.M. (voice), M.A., CCC/SLP  Speech-Language Pathologist  [facility]  [phone]              "

## 2017-06-14 NOTE — PROGRESS NOTES
"After Visit Summary    Patient: Kate Lima  Date of Visit: 6/14/2017    Breathing:    Breathing Tips:    Breathe before all speech - and take your time in-between phrases and words                                              Be careful to avoid shallow breathing.     Voice:    Bubbles (straw in 1.5 to 2  of water):    blow 10-15 seconds with no voice and keep bubbles consistent.    3x: blow bubbles and add a sustained  who  or an  oo  (comfortable pitch ), Then move straw to the top of the water, Finally out of the water using the straw only.    3x: blow bubbles and vary  who  gliding up and down             Up and down like a sine wave       These exercises are great for:                                                    *warm up / cool down - Part of the morning routing and before and after extended voice use.                                                    *tissue mobilization exercise - Improving the condition and pliability of the vocal folds.                                                    *Abdominal breathing and applying optimal breath flow to speech/singing.      \"Talk through your nose\"  - maintain a bright forward focus.    2-3X/DAY PRACTICE:     M+ vowels: Hold the MMMMMMMMMMMMMMMMMMMMMMMMMMMMM_ah (complete each vowel combination 2x).     m  words    Pick 5 words, or say \"made\" 5 times.     m  sentences    Complete 5 phrases.  Hold the first MMMMM_eet my mom  -  n  sentences    First 10 sentences, skip #5    Blending phrases - 1st column, and the longer phrases        [place] (1-2X./DAY)      *Try to let the jaw relax, and use your hands on your cheeks.     Charito Parker M.M. (voice), M.A., CCC/SLP  Speech-Language Pathologist  [facility]  [phone]        "

## 2017-06-14 NOTE — MR AVS SNAPSHOT
After Visit Summary   6/14/2017    Kate Lima    MRN: [id number]           Patient Information     Date Of Birth          1940        Visit Information        Provider Department      6/14/2017 9:00 AM Charito Parker SLP [facility]        Today's Diagnoses     Dysphonia    -  1       Follow-ups after your visit        Your next 10 appointments already scheduled     Jul 27, 2017  9:00 AM CDT   (Arrive by 8:45 AM)   [facility] with [facility] ([facility])    [address] [place] [address] [id number] [phone]            Aug 30, 2017  9:00 AM CDT   (Arrive by 8:45 AM)   Return Visit with [facility] ([facility])    [address] [place] [address] [id number] [phone]            Sep 28, 2017  9:00 AM CDT   (Arrive by 8:45 AM)   Return Visit with [facility] ([facility])    [address] [place] [id number] [phone]              Who to contact     Please call your clinic at [phone] to:    Ask questions about your health    Make or cancel appointments    Discuss your medicines    Learn about your test results    Speak to your doctor   If you have compliments or concerns about an experience at your clinic, or if you wish to file a complaint, please contact [facility] Patient Relations at [phone] or email us at [email or web address]         Additional Information About Your Visit        MyChart Information     [employer] gives you secure access to your electronic health record. If you see a primary care provider, you can also send messages to your care team and make appointments. If you have questions, please call your primary care clinic.  If you do not have a primary care provider, please call [phone] and they will assist  you.      [employer] is an electronic gateway that provides easy, online access to your medical records. With Users[employer], you can request a clinic appointment, read your test results, renew a prescription or communicate with your care team.     To access your existing account, please contact your [facility] or call [phone] for assistance.        Care EveryWhere ID     This is your Care EveryWhere ID. This could be used by other organizations to access your [place] medical records  [id number]         Blood Pressure from Last 3 Encounters:   05/11/17 128/62   01/12/17 136/73   05/05/16 120/62    Weight from Last 3 Encounters:   05/11/17 62.1 kg (137 lb)   12/09/16 62.6 kg (138 lb)   05/05/16 62.1 kg (137 lb)              We Performed the Following     SPEECH/HEARING THERAPY, INDIVIDUAL        Primary Care Provider Office Phone # Fax #    Bao Logan [phone]       [facility] [address] [address]        Equal Access to Services     [facility]: Hadii aad ku hadasho Soomaali, waaxda luqadaha, qaybta kaalmada adeegyada, waxay idiin hayaan reilly collins . So [facility] [phone].    ATENCIÓN: Si habla español, tiene a coker disposición servicios gratuitos de asistencia lingüística. Llame al [phone].    We comply with applicable federal civil rights laws and [place] laws. We do not discriminate on the basis of race, color, national origin, age, disability sex, sexual orientation or gender identity.            Thank you!     Thank you for choosing  [facility]  for your care. Our goal is always to provide you with excellent care. Hearing back from our patients is one way we can continue to improve our services. Please take a few minutes to complete the written survey that you may receive in the mail after your visit with us. Thank you!             Your Updated Medication List - Protect others around you: Learn how to safely use,  store and throw away your medicines at www.disposemymeds.org.          This list is accurate as of: 6/14/17 11:59 PM.  Always use your most recent med list.                   Brand Name Dispense Instructions for use Diagnosis    CALCIUM 600+D PO      Take  by mouth daily.        cholecalciferol 1000 UNITS capsule    vitamin  -D     Take 1 capsule by mouth daily.        ELIDEL 1 % cream   Generic drug:  pimecrolimus      Apply  topically 2 times daily.        estradiol 0.1 MG/GM cream    ESTRACE VAGINAL    42.5 g    Place 1 g vaginally twice a week Pt will call for Rx. Don't fill yet    Atrophic vaginitis       fiber modular packet      3 times daily (with meals)        * fluocinonide 0.05 % ointment    LIDEX          * fluocinonide 0.05 % solution    LIDEX     apply to ears PRN per derm        glucosamine 500 MG Tabs      Take  by mouth daily.        hydrocortisone 0.2 % cream    WESTCORT     Apply  topically 2 times daily.        MUPIROCIN EX      Externally apply  topically.        XALATAN 0.005 % ophthalmic solution   Generic drug:  latanoprost      1 drop At Bedtime.        * Notice:  This list has 2 medication(s) that are the same as other medications prescribed for you. Read the directions carefully, and ask your doctor or other care provider to review them with you.

## 2017-08-30 ENCOUNTER — OFFICE VISIT (OUTPATIENT)
Dept: OTOLARYNGOLOGY | Facility: CLINIC | Age: 77
End: 2017-08-30

## 2017-08-30 DIAGNOSIS — R49.0 DYSPHONIA: Primary | ICD-10-CM

## 2017-08-30 NOTE — MR AVS SNAPSHOT
After Visit Summary   8/30/2017    Kate Lima    MRN: [id number]           Patient Information     Date Of Birth          1940        Visit Information        Provider Department      8/30/2017 9:00 AM Charito Parker SLP [facility]        Today's Diagnoses     Dysphonia    -  1       Follow-ups after your visit        Your next 10 appointments already scheduled     Nov 30, 2017 10:00 AM CST   (Arrive by 9:45 AM)   Return Visit with [facility] ([facility])    [address] [place] [id number] [phone]              Who to contact     Please call your clinic at [phone] to:    Ask questions about your health    Make or cancel appointments    Discuss your medicines    Learn about your test results    Speak to your doctor   If you have compliments or concerns about an experience at your clinic, or if you wish to file a complaint, please contact [facility] Patient Relations at [phone] or email us at [email or web address]         Additional Information About Your Visit        MyChart Information     [employer] gives you secure access to your electronic health record. If you see a primary care provider, you can also send messages to your care team and make appointments. If you have questions, please call your primary care clinic.  If you do not have a primary care provider, please call [phone] and they will assist you.      UBmatrix is an electronic gateway that provides easy, online access to your medical records. With [employer], you can request a clinic appointment, read your test results, renew a prescription or communicate with your care team.     To access your existing account, please contact your [facility] or call [phone] for assistance.        Care EveryWhere ID     This is your Care EveryWhere ID. This could be used by other  organizations to access your [place] medical records  [id number]         Blood Pressure from Last 3 Encounters:   05/11/17 128/62   01/12/17 136/73   05/05/16 120/62    Weight from Last 3 Encounters:   05/11/17 62.1 kg (137 lb)   12/09/16 62.6 kg (138 lb)   05/05/16 62.1 kg (137 lb)              We Performed the Following     C BEHAVIORAL & QUALITATIVE ANALYSIS VOICE AND RESONANCE     HC  SLP VOICE CURRENT STATUS     HC SLP VOICE GOAL STATUS     SPEECH/HEARING THERAPY, INDIVIDUAL        Primary Care Provider Office Phone # Fax #    Bao Logan [phone]       [facility] [address] [address]        Equal Access to Services     ALICJA WOLFE : Hadii cholo ku hadasho Sosrideviali, waaxda luqadaha, qaybta kaalmada adeegyada, waxay idiin haymarcos collins . So [facility] [phone].    ATENCIÓN: Si habla español, tiene a coker disposición servicios gratuitos de asistencia lingüística. [facility] [phone].    We comply with applicable federal civil rights laws and [place] laws. We do not discriminate on the basis of race, color, national origin, age, disability sex, sexual orientation or gender identity.            Thank you!     Thank you for choosing [facility] VOICE  for your care. Our goal is always to provide you with excellent care. Hearing back from our patients is one way we can continue to improve our services. Please take a few minutes to complete the written survey that you may receive in the mail after your visit with us. Thank you!             Your Updated Medication List - Protect others around you: Learn how to safely use, store and throw away your medicines at www.disposemymeds.org.          This list is accurate as of: 8/30/17 11:59 PM.  Always use your most recent med list.                   Brand Name Dispense Instructions for use Diagnosis    CALCIUM 600+D PO      Take  by mouth daily.        cholecalciferol 1000 UNITS capsule    vitamin  -D     Take  1 capsule by mouth daily.        ELIDEL 1 % cream   Generic drug:  pimecrolimus      Apply  topically 2 times daily.        estradiol 0.1 MG/GM cream    ESTRACE VAGINAL    42.5 g    Place 1 g vaginally twice a week Pt will call for Rx. Don't fill yet    Atrophic vaginitis       fiber modular packet      3 times daily (with meals)        * fluocinonide 0.05 % ointment    LIDEX          * fluocinonide 0.05 % solution    LIDEX     apply to ears PRN per derm        glucosamine 500 MG Tabs      Take  by mouth daily.        hydrocortisone 0.2 % cream    WESTCORT     Apply  topically 2 times daily.        MUPIROCIN EX      Externally apply  topically.        XALATAN 0.005 % ophthalmic solution   Generic drug:  latanoprost      1 drop At Bedtime.        * Notice:  This list has 2 medication(s) that are the same as other medications prescribed for you. Read the directions carefully, and ask your doctor or other care provider to review them with you.

## 2017-08-30 NOTE — LETTER
8/30/2017       RE: Kate Lima  [address], [place] [address]     Dear Colleague,    Thank you for referring your patient, Kate Lima, to the [facility]. Please see a copy of my visit note below.    [facility]  Franklin Merlos Jr., M.D., F.A.C.S.  Almaz Lafleur M.D., M.P.H.  Jolly George, Ph.D., CCC/SLP  Charito Parker M.M. (voice), M.A., CCC/SLP  Aguila Almaguer M.M. (voice), M.A., CCC/SLP    [facility]  VOICE/SPEECH/BREATHING RE-EVALUATION AND THERAPY PROGRESS REPORT    Patient: Kate Lima  Date of Service: 8/30/2017    PROGRESS SINCE LAST SESSION  Ms. Lima was last seen on 6/14/17.  At that time, she worked on learning therapeutic activities to improve her voice quality and comfort, secondary to dysphonia; allowing  her to meet personal and professional vocal demands and fully engage in activities of daily living.     Regarding practice, Ms. Lima reports the following:     Irregular practice with and without the recording    Does not feel a recording would be helpful.    The after visit summary is helpful to facilitate practice.    voice is improved during the exercises, but minimal carryover to spontaneous conversation    she experiences improvement in fatigue and discomfort while doing the exercises    Ms. Lima also states that:    Knee surgery    Lichen planus (LP) and multiple tooth issues.  [facility], as a bacterial infection was found in her mouth.    VOICE AND SPEECH ASSESSMENT  An assessment of the voice/ speech/ breathing was accomplished and audio recorded today in order to assess current status, since the initial evaluation took place on 12/9/16; salient features are as follows:    Palpation of the laryngeal area shows:    Firm musculature    Tenderness of the thyrohyoid area     Cough/ Throat clear:    Not observed    Breathing  pattern:    Inspirations are often inadequate for speech in volume and frequency    Thoracic muscle use pattern    Shoulder and neck involvement    Tension is evident:    Jaw - open for an hour on Monday during her dental appointment, and continues to feel sore.    Upper body    Neck and shoulders    Voice quality is characterized by    Mod to severe roughness, breathiness    Mild strain    Better with /h/ initial words    Habitual pitch is G3    Loudness is WNL and appropriate for the setting    Comfortable pitch /a/: Ab3 mild pitch instability    High pitch /a/: G4 mod strain and breathiness, mild to mod rough    Low pitch /a/: Ab3-jA3 - pitch instablility    Comfortable pitch /i/:  Clear A3 (indicating base of tongue tension)    In a vocal diadochokinesis task, rate and rhythm are /i/ was irreg and blended; glottic coup is WNL    Whisper is normal; soft phonation is hard to do soft and rough; a yelynne is mod reduced    In a pitch glide task to determine pitch range - limited lower range    she states today is a typical voice day    she rates her effort as 5 out of 10 (10 is maximum effort) for speech; 5 out of 10 for singing    GLOBAL ASSESSMENT OF DYSPHONIA: 41/100    CAPE-V Overall Severity:  55/100    THERAPEUTIC ACTIVITIES  Today Ms. Lima participated in the following therapeutic activities:    Asked many questions about the nature of her symptoms, and I answered all of these thoroughly.    Demonstrated previous exercises.  o demonstrated improved technique  o appropriate redirection provided  o instruction provided for increased level of complexity/difficulty    Exercises to add phonation to the optimal flowing airstream.  o Semi-occluded vocal tract exercises with a /z/ were most facilitating  o Instructed to use as a voice warm up, cool down, coordination of breath flow with phonation, and for tissue mobilization.  o good learning, but will need practice     Bertha exercises for improved glottic  closure.  o able to produce acceptable glottic coup  o /g/ loaded phrases were helpful.    Pelzer exercises to experience a more forward sensation during phonation.  o speech material with nasal continuants was facilitating  o speech material that elicits a high, forward tongue position was facilitating  o able to recognize improvement in quality and comfort  o able to progress to level of /n/ was more facilitating today, based on the base of tongue tension that she demonstrated during her voice evaluation.  o good learning, but will need practice    Pelzer concepts of an optimal regimen for practice.  o she should use an interval schedule of practice, with brief periods of practice frequently throughout each day  o Pelzer concepts of volitional practice to facilitate motor learning.    I provided an after visit summary to help facilitate practice.    An audio recording of today's therapeutic activities was provided, to facilitate practice.    IMPRESSIONS/GOALS/PLAN  IMPRESSIONS / RECOMMENDATIONS  Based on today's evaluation and laryngeal examination, it appears that:    her dysphonia is accounted for by laryngeal irritation and supraglottic hyperfunction.  There were no suspicious lesions in the larynx or on the vocal fold mucosa.    because there appears to be a functional component to her dysphonia, she would most likely benefit from functional speech therapy.    She is entirely amenable to this plan    RATIONALE: Current level of functioning is:  CJ - At least 20 percent but less than 40 percent impaired, limited, or restricted  based on Ms. Lima's extent of dysphonia, extent of impact on function, extent of discomfort, level of effort.     TREATMENT PLAN  Speech therapy     DURATION/FREQUENCY OF TREATMENT  2 weekly sessions, followed by 2 bi weekly sessions.  She agreed to contact our clinic for appointments when she is in recovery from her tooth/mouth issues.     PROGNOSIS  Good prognosis for voice  improvement with speech therapy and regular practice of therapeutic activities.      Progress toward long-term goals:  Limited and incomplete progress due to other acute health issues.    GOALS  Patient goal:   1. To understand the problem and fix it as much as possible  2. To have a normal and acceptable voice quality        Long-term goal(s): In 6months, Ms. Lima will:  1. Report a speaking voice quality that is acceptable to her, 90%of the time  2. In a 20-minute conversation task, demonstrate 90% accuracy with forward resonance/optimal breath flow, by therapist judgment       G-Codes:   - Voice functional limitation, current status, at evalution CJ - At least 20 percent but less than 40 percent impaired, limited, or restricted and  - Voice functional limitation, projected goal status, at reporting interval CI - At least 1 percent but less than 20 percent impaired, limited, or restricted     PRIMARY ICD-10 code:  R49.0 (Dysphonia)     TOTAL SERVICE TIME: 60 minutes  EVALUATION OF VOICE AND RESONANCE: (37355): 60 minutes    NO CHARGE FACILITY FEE (15117)     Charito Parker M.M. (voice) M.A., CCC/SLP  Speech-Language Pathologist  [facility] Voice [facility]  [phone]

## 2017-08-30 NOTE — PROGRESS NOTES
[facility]  Franklin Merlos Jr., M.D., F.A.C.S.  Almaz Lafleur M.D., M.P.H.  Jolly George, Ph.D., CCC/SLP  Charito Parker M.M. (voice), M.A., CCC/SLP  Aguila Almaguer M.M. (voice), M.A., [facility]/SLP    [facility]  VOICE/SPEECH/BREATHING RE-EVALUATION AND THERAPY PROGRESS REPORT    Patient: Kate Lima  Date of Service: 8/30/2017    PROGRESS SINCE LAST SESSION  Ms. Lima was last seen on 6/14/17.  At that time, she worked on learning therapeutic activities to improve her voice quality and comfort, secondary to dysphonia; allowing  her to meet personal and professional vocal demands and fully engage in activities of daily living.     Regarding practice, Ms. Lima reports the following:     Irregular practice with and without the recording    Does not feel a recording would be helpful.    The after visit summary is helpful to facilitate practice.    voice is improved during the exercises, but minimal carryover to spontaneous conversation    she experiences improvement in fatigue and discomfort while doing the exercises    Ms. Lima also states that:    Knee surgery    Lichen planus (LP) and multiple tooth issues.  She recently had a tooth sent here to the [facility] for biopsy, as a bacterial infection was found in her mouth.    VOICE AND SPEECH ASSESSMENT  An assessment of the voice/ speech/ breathing was accomplished and audio recorded today in order to assess current status, since the initial evaluation took place on 12/9/16; salient features are as follows:    Palpation of the laryngeal area shows:    Firm musculature    Tenderness of the thyrohyoid area     Cough/ Throat clear:    Not observed    Breathing pattern:    Inspirations are often inadequate for speech in volume and frequency    Thoracic muscle use pattern    Shoulder and neck involvement    Tension is evident:    Jaw - open for an hour on Monday during her dental appointment, and continues to feel sore.    Upper body    Neck  and shoulders    Voice quality is characterized by    Mod to severe roughness, breathiness    Mild strain    Better with /h/ initial words    Habitual pitch is G3    Loudness is WNL and appropriate for the setting    Comfortable pitch /a/: Ab3 mild pitch instability    High pitch /a/: G4 mod strain and breathiness, mild to mod rough    Low pitch /a/: Ab3-jA3 - pitch instablility    Comfortable pitch /i/:  Clear A3 (indicating base of tongue tension)    In a vocal diadochokinesis task, rate and rhythm are /i/ was irreg and blended; glottic coup is WNL    Whisper is normal; soft phonation is hard to do soft and rough; a yelynne is mod reduced    In a pitch glide task to determine pitch range - limited lower range    she states today is a typical voice day    she rates her effort as 5 out of 10 (10 is maximum effort) for speech; 5 out of 10 for singing    GLOBAL ASSESSMENT OF DYSPHONIA: 41/100    CAPE-V Overall Severity:  55/100    THERAPEUTIC ACTIVITIES  Today Ms. Lima participated in the following therapeutic activities:    Asked many questions about the nature of her symptoms, and I answered all of these thoroughly.    Demonstrated previous exercises.  o demonstrated improved technique  o appropriate redirection provided  o instruction provided for increased level of complexity/difficulty    Exercises to add phonation to the optimal flowing airstream.  o Semi-occluded vocal tract exercises with a /z/ were most facilitating  o Instructed to use as a voice warm up, cool down, coordination of breath flow with phonation, and for tissue mobilization.  o good learning, but will need practice     Kaloko exercises for improved glottic closure.  o able to produce acceptable glottic coup  o /g/ loaded phrases were helpful.    Kaloko exercises to experience a more forward sensation during phonation.  o speech material with nasal continuants was facilitating  o speech material that elicits a high, forward tongue position was  facilitating  o able to recognize improvement in quality and comfort  o able to progress to level of /n/ was more facilitating today, based on the base of tongue tension that she demonstrated during her voice evaluation.  o good learning, but will need practice    Hundred concepts of an optimal regimen for practice.  o she should use an interval schedule of practice, with brief periods of practice frequently throughout each day  o Hundred concepts of volitional practice to facilitate motor learning.    I provided an after visit summary to help facilitate practice.    An audio recording of today's therapeutic activities was provided, to facilitate practice.    IMPRESSIONS/GOALS/PLAN  IMPRESSIONS / RECOMMENDATIONS  Based on today's evaluation and laryngeal examination, it appears that:    her dysphonia is accounted for by laryngeal irritation and supraglottic hyperfunction.  There were no suspicious lesions in the larynx or on the vocal fold mucosa.    because there appears to be a functional component to her dysphonia, she would most likely benefit from functional speech therapy.    She is entirely amenable to this plan    RATIONALE: Current level of functioning is:  CJ - At least 20 percent but less than 40 percent impaired, limited, or restricted  based on Ms. Lima's extent of dysphonia, extent of impact on function, extent of discomfort, level of effort.     TREATMENT PLAN  Speech therapy     DURATION/FREQUENCY OF TREATMENT  2 weekly sessions, followed by 2 bi weekly sessions.  She agreed to contact our clinic for appointments when she is in recovery from her tooth/mouth issues.     PROGNOSIS  Good prognosis for voice improvement with speech therapy and regular practice of therapeutic activities.      Progress toward long-term goals:  Limited and incomplete progress due to other acute health issues.    GOALS  Patient goal:   1. To understand the problem and fix it as much as possible  2. To have a normal and  acceptable voice quality        Long-term goal(s): In 6months, Ms. Lima will:  1. Report a speaking voice quality that is acceptable to her, 90%of the time  2. In a 20-minute conversation task, demonstrate 90% accuracy with forward resonance/optimal breath flow, by therapist judgment       G-Codes:   - Voice functional limitation, current status, at evalution CJ - At least 20 percent but less than 40 percent impaired, limited, or restricted and  - Voice functional limitation, projected goal status, at reporting interval CI - At least 1 percent but less than 20 percent impaired, limited, or restricted     PRIMARY ICD-10 code:  R49.0 (Dysphonia)     TOTAL SERVICE TIME: 60 minutes  EVALUATION OF VOICE AND RESONANCE: (51473): 60 minutes    NO CHARGE FACILITY FEE (71243)     Charito Parker M.M. (voice), M.A., CCC/SLP  Speech-Language Pathologist  [facility]  [phone]

## 2018-05-17 ENCOUNTER — OFFICE VISIT (OUTPATIENT)
Dept: OBGYN | Facility: CLINIC | Age: 78
End: 2018-05-17
Payer: COMMERCIAL

## 2018-05-17 ENCOUNTER — RADIANT APPOINTMENT (OUTPATIENT)
Dept: MAMMOGRAPHY | Facility: CLINIC | Age: 78
End: 2018-05-17
Payer: COMMERCIAL

## 2018-05-17 VITALS
HEART RATE: 80 BPM | SYSTOLIC BLOOD PRESSURE: 132 MMHG | HEIGHT: 65 IN | BODY MASS INDEX: 22.66 KG/M2 | DIASTOLIC BLOOD PRESSURE: 58 MMHG | WEIGHT: 136 LBS

## 2018-05-17 DIAGNOSIS — Z12.31 VISIT FOR SCREENING MAMMOGRAM: ICD-10-CM

## 2018-05-17 DIAGNOSIS — Z01.419 ENCOUNTER FOR GYNECOLOGICAL EXAMINATION WITHOUT ABNORMAL FINDING: Primary | ICD-10-CM

## 2018-05-17 PROCEDURE — G0101 CA SCREEN;PELVIC/BREAST EXAM: HCPCS | Performed by: OBSTETRICS & GYNECOLOGY

## 2018-05-17 PROCEDURE — 77067 SCR MAMMO BI INCL CAD: CPT | Mod: TC

## 2018-05-17 PROCEDURE — 77063 BREAST TOMOSYNTHESIS BI: CPT | Mod: TC

## 2018-05-17 RX ORDER — ATORVASTATIN CALCIUM 20 MG/1
20 TABLET, FILM COATED ORAL
COMMUNITY
Start: 2018-05-15 | End: 2024-02-25

## 2018-05-17 RX ORDER — ACETAMINOPHEN 500 MG
1000 TABLET ORAL 2 TIMES DAILY
COMMUNITY
Start: 2018-05-14

## 2018-05-17 ASSESSMENT — ANXIETY QUESTIONNAIRES
6. BECOMING EASILY ANNOYED OR IRRITABLE: NOT AT ALL
GAD7 TOTAL SCORE: 0
5. BEING SO RESTLESS THAT IT IS HARD TO SIT STILL: NOT AT ALL
IF YOU CHECKED OFF ANY PROBLEMS ON THIS QUESTIONNAIRE, HOW DIFFICULT HAVE THESE PROBLEMS MADE IT FOR YOU TO DO YOUR WORK, TAKE CARE OF THINGS AT HOME, OR GET ALONG WITH OTHER PEOPLE: NOT DIFFICULT AT ALL
2. NOT BEING ABLE TO STOP OR CONTROL WORRYING: NOT AT ALL
7. FEELING AFRAID AS IF SOMETHING AWFUL MIGHT HAPPEN: NOT AT ALL
1. FEELING NERVOUS, ANXIOUS, OR ON EDGE: NOT AT ALL
3. WORRYING TOO MUCH ABOUT DIFFERENT THINGS: NOT AT ALL

## 2018-05-17 ASSESSMENT — PATIENT HEALTH QUESTIONNAIRE - PHQ9: 5. POOR APPETITE OR OVEREATING: NOT AT ALL

## 2018-05-17 NOTE — MR AVS SNAPSHOT
"              After Visit Summary   5/17/2018    Kate Lima    MRN: [id number]           Patient Information     Date Of Birth          1940        Visit Information        Provider Department      5/17/2018 10:30 AM Jorje Reyes MD [facility]        Today's Diagnoses     Encounter for gynecological examination without abnormal finding    -  1       Follow-ups after your visit        Who to contact     If you have questions or need follow up information about today's clinic visit or your schedule please contact [facility] directly at [phone].  Normal or non-critical lab and imaging results will be communicated to you by Dreampodhart, letter or phone within 4 business days after the clinic has received the results. If you do not hear from us within 7 days, please contact the clinic through Snipdt or phone. If you have a critical or abnormal lab result, we will notify you by phone as soon as possible.  Submit refill requests through [employer] or call your pharmacy and they will forward the refill request to us. Please allow 3 business days for your refill to be completed.          Additional Information About Your Visit        MyChart Information     [employer] gives you secure access to your electronic health record. If you see a primary care provider, you can also send messages to your care team and make appointments. If you have questions, please call your primary care clinic.  If you do not have a primary care provider, please call [phone] and they will assist you.        Care EveryWhere ID     This is your Care EveryWhere ID. This could be used by other organizations to access your [place] medical records  [id number]        Your Vitals Were     Pulse Height Breastfeeding? BMI (Body Mass Index)          80 5' 4.5\" (1.638 m) No 22.98 kg/m2         Blood Pressure from Last 3 Encounters:   05/17/18 132/58   05/11/17 128/62   01/12/17 136/73    Weight " from Last 3 Encounters:   05/17/18 136 lb (61.7 kg)   05/11/17 137 lb (62.1 kg)   12/09/16 138 lb (62.6 kg)              We Performed the Following     Medicare Limited Visit        Primary Care Provider Office Phone # Fax #    Tim Matute [phone]       [facility] [address] [address]        Equal Access to Services     [facility]: Hadii aad ku hadasho Soomaali, waaxda luqadaha, qaybta kaalmada adeegyada, waxay idiin hayaan adeeg kharalouisa la'marcos . So [facility] [phone].    ATENCIÓN: Si habla español, tiene a coker disposición servicios gratuitos de asistencia lingüística. [facility] [phone].    We comply with applicable federal civil rights laws and [place] laws. We do not discriminate on the basis of race, color, national origin, age, disability, sex, sexual orientation, or gender identity.            Thank you!     Thank you for choosing [facility]  for your care. Our goal is always to provide you with excellent care. Hearing back from our patients is one way we can continue to improve our services. Please take a few minutes to complete the written survey that you may receive in the mail after your visit with us. Thank you!             Your Updated Medication List - Protect others around you: Learn how to safely use, store and throw away your medicines at www.disposemymeds.org.          This list is accurate as of 5/17/18 11:18 AM.  Always use your most recent med list.                   Brand Name Dispense Instructions for use Diagnosis    acetaminophen 500 MG tablet    TYLENOL     Patient states she takes 6 tabs daily        atorvastatin 20 MG tablet    LIPITOR     Take 20 mg by mouth        CALCIUM 600+D PO      Take  by mouth daily.        cholecalciferol 1000 units capsule    vitamin  -D     Take 1 capsule by mouth daily.        ELIDEL 1 % cream   Generic drug:  pimecrolimus      Apply  topically 2 times daily.        fiber  modular packet      3 times daily (with meals)        * fluocinonide 0.05 % ointment    LIDEX          * fluocinonide 0.05 % solution    LIDEX     apply to ears PRN per derm        glucosamine 500 MG Tabs      Take  by mouth daily.        hydrocortisone 0.2 % cream    WESTCORT     Apply  topically 2 times daily.        MUPIROCIN EX      Externally apply  topically.        * Notice:  This list has 2 medication(s) that are the same as other medications prescribed for you. Read the directions carefully, and ask your doctor or other care provider to review them with you.

## 2018-05-18 ASSESSMENT — ANXIETY QUESTIONNAIRES: GAD7 TOTAL SCORE: 0

## 2018-05-18 ASSESSMENT — PATIENT HEALTH QUESTIONNAIRE - PHQ9: SUM OF ALL RESPONSES TO PHQ QUESTIONS 1-9: 0

## 2018-08-31 ENCOUNTER — RADIANT APPOINTMENT (OUTPATIENT)
Dept: ULTRASOUND IMAGING | Facility: CLINIC | Age: 78
End: 2018-08-31
Attending: OBSTETRICS & GYNECOLOGY
Payer: COMMERCIAL

## 2018-08-31 ENCOUNTER — OFFICE VISIT (OUTPATIENT)
Dept: OBGYN | Facility: CLINIC | Age: 78
End: 2018-08-31
Payer: COMMERCIAL

## 2018-08-31 VITALS
DIASTOLIC BLOOD PRESSURE: 76 MMHG | SYSTOLIC BLOOD PRESSURE: 132 MMHG | HEIGHT: 65 IN | WEIGHT: 136.8 LBS | BODY MASS INDEX: 22.79 KG/M2

## 2018-08-31 DIAGNOSIS — N95.0 POSTMENOPAUSAL BLEEDING: ICD-10-CM

## 2018-08-31 DIAGNOSIS — N95.0 POSTMENOPAUSAL BLEEDING: Primary | ICD-10-CM

## 2018-08-31 PROCEDURE — 99214 OFFICE O/P EST MOD 30 MIN: CPT | Performed by: OBSTETRICS & GYNECOLOGY

## 2018-08-31 PROCEDURE — 76830 TRANSVAGINAL US NON-OB: CPT | Performed by: OBSTETRICS & GYNECOLOGY

## 2018-08-31 NOTE — PROGRESS NOTES
SUBJECTIVE:                                                   Kate Lima is a 77 year old female who presents to clinic today for the following health issue(s):  Patient presents with:  Abnormal Uterine Bleeding: patient had bright red light bleeding on Tuesday, one day only.      HPI:  Noted one day of light spotting noted on pad. No pain or dysuria. No further spotting noted. Taking Tylenol for back pain. Has surgery coming up for fusion of spine.  No FH of GYN cancers.    No LMP recorded. Patient is postmenopausal..   Patient is sexually active, .  Using menopause for contraception.    reports that she has never smoked. She has never used smokeless tobacco.    STD testing offered?  Declined    Health maintenance updated:  yes    Today's PHQ-2 Score: No flowsheet data found.  Today's PHQ-9 Score:   PHQ-9 SCORE 2018   Total Score 0     Today's RENÉE-7 Score:   RENÉE-7 SCORE 2018   Total Score 0       Problem list and histories reviewed & adjusted, as indicated.  Additional history: as documented.    Patient Active Problem List   Diagnosis     Arthritis     Dysphonia     Past Surgical History:   Procedure Laterality Date     ORTHOPEDIC SURGERY  2009    carpal tunnel surgery + new thumb joints     right and left thumb joints        Social History   Substance Use Topics     Smoking status: Never Smoker     Smokeless tobacco: Never Used     Alcohol use 0.0 oz/week      Comment: 1-2 drinks a month      Problem (# of Occurrences) Relation (Name,Age of Onset)    Cancer (3) Mother, Father, Brother    Colon Cancer (1) Other (3 uncles)    Depression (1) Mother    Hypertension (1) Mother    Lung Cancer (2) Father, Brother            Current Outpatient Prescriptions   Medication Sig     acetaminophen (TYLENOL) 500 MG tablet Patient states she takes 6 tabs daily     atorvastatin (LIPITOR) 20 MG tablet Take 20 mg by mouth     Calcium Carbonate-Vitamin D (CALCIUM 600+D PO) Take  by mouth daily.      "cholecalciferol (VITAMIN  -D) 1000 UNITS capsule Take 1 capsule by mouth daily.     fiber modular (NUTRISOXMarketE FIBER) packet 3 times daily (with meals)     fluocinonide (LIDEX) 0.05 % ointment      fluocinonide (LIDEX) 0.05 % solution apply to ears PRN per derm     hydrocortisone (WESTCORT) 0.2 % cream Apply  topically 2 times daily.     MUPIROCIN EX Externally apply  topically.     pimecrolimus (ELIDEL) 1 % cream Apply  topically 2 times daily.     teriparatide, recombinant, (FORTEO) 600 MCG/2.4ML SOLN injection Inject 20 mcg Subcutaneous     No current facility-administered medications for this visit.      Allergies   Allergen Reactions     Bacitracin        ROS:  12 point review of systems negative other than symptoms noted below.  Constitutional: Fatigue  Genitourinary: Painful [place], Spotting and Vaginal Dryness  Musculoskeletal: Height Loss and Joint Pain    OBJECTIVE:     /76  Ht 5' 4.5\" (1.638 m)  Wt 136 lb 12.8 oz (62.1 kg)  BMI 23.12 kg/m2  Body mass index is 23.12 kg/(m^2).    Exam:  Constitutional:  Appearance: Well nourished, well developed alert, in no acute distress  Neurologic/Psychiatric:  Mental Status:  Oriented X3   Pelvic Exam:  External Genitalia:     Normal appearance for age, no discharge present, no tenderness present, no inflammatory lesions present, color normal  Vagina:     Normal vaginal vault without central or paravaginal defects, ATROPHIC. No blood in vagina  Bladder:     Nontender to palpation  Urethra:   Urethral Body:  Urethra palpation normal, urethra structural support normal   Urethral Meatus:  CARUNCLE PRESENT  Cervix:     Appearance healthy, no lesions present, nontender to palpation, no bleeding present. Stenotic    Perineum:     Perineum within normal limits, no evidence of trauma, no rashes or skin lesions present         In-Clinic Test Results:  No results found for this or any previous visit (from the past 24 hour(s)).    ASSESSMENT/PLAN:                    "                                     ICD-10-CM    1. Postmenopausal bleeding N95.0 US Transvaginal Non OB       No blood noted on exam. Possible etiology is urethral caruncle or uterine.   Due to stenotic appearance of cervix, ultrasound will be done for endometrial thickness.    U/S shows endometrial thickness of 4.78. Will observe for now. If bleeds again place tampon and call with findings.    Jorje Reyes MD  [facility]

## 2018-08-31 NOTE — MR AVS SNAPSHOT
"              After Visit Summary   8/31/2018    Kate Lima    MRN: [id number]           Patient Information     Date Of Birth          1940        Visit Information        Provider Department      8/31/2018 8:40 AM Jorje Reyes MD [facility]        Today's Diagnoses     Postmenopausal bleeding    -  1       Follow-ups after your visit        Who to contact     If you have questions or need follow up information about today's clinic visit or your schedule please contact [facility] directly at [phone].  Normal or non-critical lab and imaging results will be communicated to you by [employer]ange[employer], letter or phone within 4 business days after the clinic has received the results. If you do not hear from us within 7 days, please contact the clinic through [employer] Trust[employer] or phone. If you have a critical or abnormal lab result, we will notify you by phone as soon as possible.  Submit refill requests through [employer] or call your pharmacy and they will forward the refill request to us. Please allow 3 business days for your refill to be completed.          Additional Information About Your Visit        MyChart Information     [employer] gives you secure access to your electronic health record. If you see a primary care provider, you can also send messages to your care team and make appointments. If you have questions, please call your primary care clinic.  If you do not have a primary care provider, please call [phone] and they will assist you.        Care EveryWhere ID     This is your Care EveryWhere ID. This could be used by other organizations to access your [facility] medical records  [id number]        Your Vitals Were     Height BMI (Body Mass Index)                5' 4.5\" (1.638 m) 23.12 kg/m2           Blood Pressure from Last 3 Encounters:   08/31/18 132/76   05/17/18 132/58   05/11/17 128/62    Weight from Last 3 Encounters:   08/31/18 136 lb 12.8 oz (62.1 kg) "   05/17/18 136 lb (61.7 kg)   05/11/17 137 lb (62.1 kg)               Primary Care Provider Office Phone # Fax #    Tim Matute [phone]       [facility] [address] [address]        Equal Access to Services     [facility] : Hadii aad ku hadasho Soomaali, waaxda luqadaha, qaybta kaalmada adeegyada, waxay idiin hayaan adeeg khdaniellouisa lanette . So [facility] [phone].    ATENCIÓN: Si habla español, tiene a coker disposición servicios gratuitos de asistencia lingüística. Ailyname al [phone].    We comply with applicable federal civil rights laws and [place] laws. We do not discriminate on the basis of race, color, national origin, age, disability, sex, sexual orientation, or gender identity.            Thank you!     Thank you for choosing [facility]  for your care. Our goal is always to provide you with excellent care. Hearing back from our patients is one way we can continue to improve our services. Please take a few minutes to complete the written survey that you may receive in the mail after your visit with us. Thank you!             Your Updated Medication List - Protect others around you: Learn how to safely use, store and throw away your medicines at www.disposemymeds.org.          This list is accurate as of 8/31/18  3:04 PM.  Always use your most recent med list.                   Brand Name Dispense Instructions for use Diagnosis    acetaminophen 500 MG tablet    TYLENOL     Patient states she takes 6 tabs daily        atorvastatin 20 MG tablet    LIPITOR     Take 20 mg by mouth        CALCIUM 600+D PO      Take  by mouth daily.        cholecalciferol 1000 units capsule    vitamin  -D     Take 1 capsule by mouth daily.        ELIDEL 1 % cream   Generic drug:  pimecrolimus      Apply  topically 2 times daily.        fiber modular packet      3 times daily (with meals)        * fluocinonide 0.05 % ointment    LIDEX          *  fluocinonide 0.05 % solution    LIDEX     apply to ears PRN per derm        hydrocortisone 0.2 % cream    WESTCORT     Apply  topically 2 times daily.        MUPIROCIN EX      Externally apply  topically.        teriparatide (recombinant) 600 MCG/2.4ML Soln injection    FORTEO     Inject 20 mcg Subcutaneous        * Notice:  This list has 2 medication(s) that are the same as other medications prescribed for you. Read the directions carefully, and ask your doctor or other care provider to review them with you.

## 2019-05-29 NOTE — PROGRESS NOTES
Kate is a 78 year old  female who presents for annual exam.     Besides routine health maintenance, she has no other health concerns today .    Do you have a Health Care Directive?: Yes: Patient states has Advance Directive and will bring in a copy to clinic.    Fall risk:   Fall Risk Assessment completed per order.    HPI:    Had successful rods placed along spine this year. Doing well.   No GYN issues. No recurrent vaginal bleeding    The patient's PCP is Dr Tim Matute MD.     GYNECOLOGIC HISTORY:  No LMP recorded. Patient is postmenopausal..   reports that she has never smoked. She has never used smokeless tobacco.    Last PHQ-9 score on record=   PHQ-9 SCORE 2019   PHQ-9 Total Score 0     Last GAD7 score on record=   RENÉE-7 SCORE 2017   Total Score 0 0 0     Alcohol Score = 2    HEALTH MAINTENANCE:  Cholesterol: followed by primary care provider,on Lipitor  Last Mammo: 17, Result: normal, Next Mammo: today   Pap: no further recommended over age 65  DEXA:  followed by Endocrinologist, severe osteoporosis, is on Forteo, recent spine surgery  Colonoscopy:  10/214, Result:  normal, Next Colonoscopy: as needed  Health maintenance updated:  yes    HISTORY:  OB History    Para Term  AB Living   2 2 2 0 0 2   SAB TAB Ectopic Multiple Live Births   0 0 0 0 0      # Outcome Date GA Lbr [place]/2nd Weight Sex Delivery Anes PTL Lv   2 Term            1 Term              Patient Active Problem List   Diagnosis     Arthritis     Dysphonia     Past Surgical History:   Procedure Laterality Date     ORTHOPEDIC SURGERY      carpal tunnel surgery + new thumb joints     right and left thumb joints       SPINE SURGERY  10/25/2018    3 fusions in back      Social History     Tobacco Use     Smoking status: Never Smoker     Smokeless tobacco: Never Used   Substance Use Topics     Alcohol use: Yes     Alcohol/week: 0.0 oz     Comment: 1-2 drinks a month      Problem (# of  "Occurrences) Relation (Name,Age of Onset)    Cancer (3) Mother, Father, Brother    Colon Cancer (1) Other (3 uncles)    Depression (1) Mother    Hypertension (1) Mother    Lung Cancer (2) Father, Brother            Current Outpatient Medications   Medication Sig     Acetaminophen (TYLENOL) 325 MG CAPS Take 325-650 mg by mouth every 4 hours as needed     acetaminophen (TYLENOL) 500 MG tablet Patient states she takes 6 tabs daily     atorvastatin (LIPITOR) 20 MG tablet Take 20 mg by mouth     Calcium Carbonate-Vitamin D (CALCIUM 600+D PO) Take  by mouth daily.     cholecalciferol (VITAMIN  -D) 1000 UNITS capsule Take 1 capsule by mouth daily.     fiber modular (Done In :60 Seconds FIBER) packet 3 times daily (with meals)     fluocinonide (LIDEX) 0.05 % ointment      fluocinonide (LIDEX) 0.05 % solution apply to ears PRN per derm     hydrocortisone (WESTCORT) 0.2 % cream Apply  topically 2 times daily.     MUPIROCIN EX Externally apply  topically.     pimecrolimus (ELIDEL) 1 % cream Apply  topically 2 times daily.     teriparatide, recombinant, (FORTEO) 600 MCG/2.4ML SOLN injection Inject 20 mcg Subcutaneous     No current facility-administered medications for this visit.        Allergies   Allergen Reactions     Bacitracin      Bacitracin-Polymyxin B      Other reaction(s): Erythema     Liquid Adhesive Rash     Uses sensitive bandaids       Past medical, surgical, social and family history were reviewed and updated in EPIC.    ROS:   12 point review of systems negative other than symptoms noted below.  Constitutional: Fatigue  Genitourinary: Vaginal Dryness  Skin: Rash  Musculoskeletal: Joint Pain  Endocrine: Loss of Hair    EXAM:  /62   Ht 1.638 m (5' 4.5\")   Wt 59.9 kg (132 lb)   BMI 22.31 kg/m     BMI: Body mass index is 22.31 kg/m .    EXAM:  Constitutional: Appearance: Well nourished, well developed alert, in no acute distress  Neck:  Lymph Nodes:  No lymphadenopathy present    Thyroid:  Gland size normal, " nontender, no nodules or masses present  on palpation  Chest:  Respiratory Effort:  Breathing unlabored  Cardiovascular:Heart    Auscultation:  Regular rate, normal rhythm, no murmurs present  Breasts: Inspection of Breasts:  No lymphadenopathy present., Palpation of Breasts and Axillae:  No masses present on palpation, no breast tenderness., Axillary Lymph Nodes:  No lymphadenopathy present. and No nodularity, asymmetry or nipple discharge bilaterally.  Gastrointestinal:  Abdominal Examination:  Abdomen nontender to palpation, tone normal without     rigidity or guarding, no masses present, umbilicus without lesions    Liver and speen:  No hepatomegaly present, liver nontender to palpation    Hernias:  No hernias present  Lymphatic: Lymph Nodes:  No other lymphadenopathy present  Skin:  General Inspection:  No rashes present, no lesions present, no areas of  discoloration.    Genitalia and Groin:  No rashes present, no lesions present, no areas of  discoloration, no masses present  Neurologic/Psychiatric:    Mental Status:  Oriented X3     Pelvic Exam:  External Genitalia:     Normal appearance for age, no discharge present, no tenderness present, no inflammatory lesions present, color normal  Vagina:     Normal vaginal vault without central or paravaginal defects, ATROPHIC  Bladder:     Nontender to palpation  Urethra:   Urethral Body:  Urethra palpation normal, urethra structural support normal   Urethral Meatus:  Urethral caruncle present  Cervix:     Appearance healthy, no lesions present, nontender to palpation, no bleeding present  Uterus:     Nontender to palpation, no masses present, position anteflexed, mobility: normal  Adnexa:     No adnexal tenderness present, no adnexal masses present  Perineum:     Perineum within normal limits, no evidence of trauma, no rashes or skin lesions present  Inguinal Lymph Nodes:     No lymphadenopathy present      COUNSELING:   Reviewed preventive health counseling, as  reflected in patient instructions       Regular exercise    BMI:  Body mass index is 22.31 kg/m .     reports that she has never smoked. She has never used smokeless tobacco.      ASSESSMENT:  78 year old female with satisfactory annual exam.    ICD-10-CM    1. Encounter for gynecological examination without abnormal finding Z01.419        PLAN:  No new findings.   RTC 1 year.     Jorje Reyes MD

## 2019-05-30 ENCOUNTER — OFFICE VISIT (OUTPATIENT)
Dept: OBGYN | Facility: CLINIC | Age: 79
End: 2019-05-30
Payer: COMMERCIAL

## 2019-05-30 ENCOUNTER — ANCILLARY PROCEDURE (OUTPATIENT)
Dept: MAMMOGRAPHY | Facility: CLINIC | Age: 79
End: 2019-05-30
Payer: COMMERCIAL

## 2019-05-30 VITALS
SYSTOLIC BLOOD PRESSURE: 128 MMHG | DIASTOLIC BLOOD PRESSURE: 62 MMHG | WEIGHT: 132 LBS | BODY MASS INDEX: 21.99 KG/M2 | HEIGHT: 65 IN

## 2019-05-30 DIAGNOSIS — Z01.419 ENCOUNTER FOR GYNECOLOGICAL EXAMINATION WITHOUT ABNORMAL FINDING: Primary | ICD-10-CM

## 2019-05-30 DIAGNOSIS — Z12.31 VISIT FOR SCREENING MAMMOGRAM: ICD-10-CM

## 2019-05-30 PROCEDURE — 77067 SCR MAMMO BI INCL CAD: CPT | Mod: TC

## 2019-05-30 PROCEDURE — 77063 BREAST TOMOSYNTHESIS BI: CPT | Mod: TC

## 2019-05-30 PROCEDURE — 99213 OFFICE O/P EST LOW 20 MIN: CPT | Performed by: OBSTETRICS & GYNECOLOGY

## 2019-05-30 ASSESSMENT — ANXIETY QUESTIONNAIRES
1. FEELING NERVOUS, ANXIOUS, OR ON EDGE: NOT AT ALL
IF YOU CHECKED OFF ANY PROBLEMS ON THIS QUESTIONNAIRE, HOW DIFFICULT HAVE THESE PROBLEMS MADE IT FOR YOU TO DO YOUR WORK, TAKE CARE OF THINGS AT HOME, OR GET ALONG WITH OTHER PEOPLE: NOT DIFFICULT AT ALL
7. FEELING AFRAID AS IF SOMETHING AWFUL MIGHT HAPPEN: NOT AT ALL
5. BEING SO RESTLESS THAT IT IS HARD TO SIT STILL: NOT AT ALL
3. WORRYING TOO MUCH ABOUT DIFFERENT THINGS: NOT AT ALL
6. BECOMING EASILY ANNOYED OR IRRITABLE: NOT AT ALL
GAD7 TOTAL SCORE: 0
2. NOT BEING ABLE TO STOP OR CONTROL WORRYING: NOT AT ALL

## 2019-05-30 ASSESSMENT — PATIENT HEALTH QUESTIONNAIRE - PHQ9
SUM OF ALL RESPONSES TO PHQ QUESTIONS 1-9: 0
5. POOR APPETITE OR OVEREATING: NOT AT ALL

## 2019-05-30 ASSESSMENT — MIFFLIN-ST. JEOR: SCORE: 1066.69

## 2019-05-31 ASSESSMENT — ANXIETY QUESTIONNAIRES: GAD7 TOTAL SCORE: 0

## 2019-08-02 ENCOUNTER — OFFICE VISIT (OUTPATIENT)
Dept: OBGYN | Facility: CLINIC | Age: 79
End: 2019-08-02
Payer: COMMERCIAL

## 2019-08-02 VITALS
SYSTOLIC BLOOD PRESSURE: 135 MMHG | WEIGHT: 133 LBS | DIASTOLIC BLOOD PRESSURE: 74 MMHG | HEIGHT: 65 IN | BODY MASS INDEX: 22.16 KG/M2

## 2019-08-02 DIAGNOSIS — N64.4 MASTALGIA: Primary | ICD-10-CM

## 2019-08-02 DIAGNOSIS — Z80.3 FH: BREAST CANCER: ICD-10-CM

## 2019-08-02 PROCEDURE — 99213 OFFICE O/P EST LOW 20 MIN: CPT | Performed by: OBSTETRICS & GYNECOLOGY

## 2019-08-02 ASSESSMENT — MIFFLIN-ST. JEOR: SCORE: 1071.22

## 2019-08-02 NOTE — PROGRESS NOTES
SUBJECTIVE:                                                   Kate Lima is a 78 year old female who presents to clinic today for the following health issue(s):  Patient presents with:  Breast Pain: right breast pain for 1 week, hard to tell if she feels lump      HPI:  Pain in right breast for a week. Notes when lies on it sleeping. No mass palpable. No nipple d/c. No erythema. Pain is mild.   Last mammo in May was normal  FH: Mom with breast cancer      No LMP recorded. Patient is postmenopausal..    reports that she has never smoked. She has never used smokeless tobacco.  Health maintenance updated:  yes    Today's PHQ-2 Score: No flowsheet data found.  Today's PHQ-9 Score:   PHQ-9 SCORE 5/30/2019   PHQ-9 Total Score 0     Today's RENÉE-7 Score:   RENÉE-7 SCORE 5/30/2019   Total Score 0       Problem list and histories reviewed & adjusted, as indicated.  Additional history: as documented.    Patient Active Problem List   Diagnosis     Arthritis     Dysphonia     Past Surgical History:   Procedure Laterality Date     ORTHOPEDIC SURGERY  2009    carpal tunnel surgery + new thumb joints     right and left thumb joints       SPINE SURGERY  10/25/2018    3 fusions in back      Social History     Tobacco Use     Smoking status: Never Smoker     Smokeless tobacco: Never Used   Substance Use Topics     Alcohol use: Yes     Alcohol/week: 0.0 oz     Comment: 1-2 drinks a month      Problem (# of Occurrences) Relation (Name,Age of Onset)    Breast Cancer (1) Mother    Cancer (2) Father, Brother    Colon Cancer (1) Other (3 uncles)    Depression (1) Mother    Hypertension (1) Mother    Lung Cancer (2) Father, Brother            Current Outpatient Medications   Medication Sig     Acetaminophen (TYLENOL) 325 MG CAPS Take 325-650 mg by mouth every 4 hours as needed     acetaminophen (TYLENOL) 500 MG tablet Patient states she takes 6 tabs daily     atorvastatin (LIPITOR) 20 MG tablet Take 20 mg by mouth     Calcium  "Carbonate-Vitamin D (CALCIUM 600+D PO) Take  by mouth daily.     cholecalciferol (VITAMIN  -D) 1000 UNITS capsule Take 1 capsule by mouth daily.     fiber modular (NUTRISOURCE FIBER) packet 3 times daily (with meals)     fluocinonide (LIDEX) 0.05 % ointment      fluocinonide (LIDEX) 0.05 % solution apply to ears PRN per derm     hydrocortisone (WESTCORT) 0.2 % cream Apply  topically 2 times daily.     MUPIROCIN EX Externally apply  topically.     pimecrolimus (ELIDEL) 1 % cream Apply  topically 2 times daily.     teriparatide, recombinant, (FORTEO) 600 MCG/2.4ML SOLN injection Inject 20 mcg Subcutaneous     No current facility-administered medications for this visit.      Allergies   Allergen Reactions     Bacitracin      Bacitracin-Polymyxin B      Other reaction(s): Erythema     Liquid Adhesive Rash     Uses sensitive bandaids       ROS:  12 point review of systems negative other than symptoms noted below.  Breast: Lumps and Tenderness  Musculoskeletal: Muscular Weakness    OBJECTIVE:     BP (!) 142/78   Ht 1.638 m (5' 4.5\")   Wt 60.3 kg (133 lb)   BMI 22.48 kg/m    Body mass index is 22.48 kg/m .    Exam:  Constitutional:  Appearance: Well nourished, well developed alert, in no acute distress  Breasts:  Inspection of Breasts:  No lymphadenopathy present; Palpation of Breasts and Axillae:  No masses present on palpation, no breast tenderness Axillary Lymph Nodes:  No lymphadenopathy present  Neurologic/Psychiatric:  Mental Status:  Oriented X3      In-Clinic Test Results:  No results found for this or any previous visit (from the past 24 hour(s)).    ASSESSMENT/PLAN:                                                        ICD-10-CM    1. Mastalgia N64.4    2. FH: breast cancer Z80.3        No mass palpable.   Gave option of imaging or waiting 2-3 weeks to see if pain resolves.   Pt will wait and call if persists for extra imaging.    Jorje Reyes MD  [facility]  "

## 2019-10-01 ENCOUNTER — HEALTH MAINTENANCE LETTER (OUTPATIENT)
Age: 79
End: 2019-10-01

## 2019-12-15 ENCOUNTER — HEALTH MAINTENANCE LETTER (OUTPATIENT)
Age: 79
End: 2019-12-15

## 2020-10-07 NOTE — PROGRESS NOTES
Kate is a 80 year old  female who presents for Medicare Limited exam.     Do you have a Health Care Directive?: Yes, advance care planning is on file.with [employer]     Fall risk:   Fallen 2 or more times in the past year?: No  Any fall with injury in the past year?: No    HPI :  Had right knee replacement. Doing PT.   Hx of nelson placement in spine.  Has a lot of pain. Sleeping cab be hard.  Has Lichen planus Dx on vulva by Derm with biopsy. Steroids didn't help. Has other creams that are helping.    GYNECOLOGIC HISTORY:  No LMP recorded. Patient is postmenopausal..   reports that she has never smoked. She has never used smokeless tobacco.    STD testing offered?  Declined  Last PHQ-9 score on record=   PHQ-9 SCORE 10/8/2020   PHQ-9 Total Score 2     Last GAD7 score on record=   RENÉE-7 SCORE 2018 2019 10/8/2020   Total Score 0 0 0       HEALTH MAINTENANCE:  Cholesterol: (No results found for: CHOL   Last Mammo: One year ago, Result: Normal, Next Mammo: Today@9:30am   Pap: (No results found for: PAP )  DEXA: @ [facility] not sure of the dates  Colonoscopy:  , Result:  Not applicable, Next Colonoscopy: NA years.    HISTORY:  OB History    Para Term  AB Living   2 2 2 0 0 2   SAB TAB Ectopic Multiple Live Births   0 0 0 0 0      # Outcome Date GA Lbr Stuart/2nd Weight Sex Delivery Anes PTL Lv   2 Term            1 Term              Past Medical History:   Diagnosis Date     Arthritis      Atrophic vaginitis      Glaucoma      Hoarseness 2013     Leukocytopenia      Lichen planus      Osteoporosis      Raynaud's disease      Reflux      Past Surgical History:   Procedure Laterality Date     ORTHOPEDIC SURGERY  2009    carpal tunnel surgery + new thumb joints     right and left thumb joints       SPINE SURGERY  10/25/2018    3 fusions in back     Family History   Problem Relation Age of Onset     Hypertension Mother      Depression Mother      Breast Cancer Mother      Cancer Father       Lung Cancer Father      Cancer Brother      Colon Cancer Other      Lung Cancer Brother      No Known Problems Sister      No Known Problems Maternal Grandfather      No Known Problems Paternal Grandmother      Social History     Socioeconomic History     Marital status:      Spouse name: Not on file     Number of children: 2     Years of education: Not on file     Highest education level: Not on file   Occupational History     Not on file   Social Needs     Financial resource strain: Not on file     Food insecurity     Worry: Not on file     Inability: Not on file     Transportation needs     Medical: Not on file     Non-medical: Not on file   Tobacco Use     Smoking status: Never Smoker     Smokeless tobacco: Never Used   Substance and Sexual Activity     Alcohol use: Yes     Alcohol/week: 0.0 standard drinks     Comment: 1-2 drinks a month     Drug use: No     Sexual activity: Yes     Partners: Male     Birth control/protection: Post-menopausal   Lifestyle     Physical activity     Days per week: Not on file     Minutes per session: Not on file     Stress: Not on file   Relationships     Social connections     Talks on phone: Not on file     Gets together: Not on file     Attends Islam service: Not on file     Active member of club or organization: Not on file     Attends meetings of clubs or organizations: Not on file     Relationship status: Not on file     Intimate partner violence     Fear of current or ex partner: Not on file     Emotionally abused: Not on file     Physically abused: Not on file     Forced sexual activity: Not on file   Other Topics Concern     Parent/sibling w/ CABG, MI or angioplasty before 65F 55M? Not Asked   Social History Narrative     Not on file     Current Outpatient Medications   Medication Sig     Acetaminophen (TYLENOL) 325 MG CAPS Take 325-650 mg by mouth every 4 hours as needed     acetaminophen (TYLENOL) 500 MG tablet Patient states she takes 6 tabs daily      "atorvastatin (LIPITOR) 20 MG tablet Take 20 mg by mouth     Calcium Carbonate-Vitamin D (CALCIUM 600+D PO) Take  by mouth daily.     cholecalciferol (VITAMIN  -D) 1000 UNITS capsule Take 1 capsule by mouth daily.     fiber modular (NUTRISOURCE FIBER) packet 3 times daily (with meals)     fluocinonide (LIDEX) 0.05 % ointment      fluocinonide (LIDEX) 0.05 % solution apply to ears PRN per derm     hydrocortisone (WESTCORT) 0.2 % cream Apply  topically 2 times daily.     MUPIROCIN EX Externally apply  topically.     pimecrolimus (ELIDEL) 1 % cream Apply  topically 2 times daily.     teriparatide, recombinant, (FORTEO) 600 MCG/2.4ML SOLN injection Inject 20 mcg Subcutaneous     No current facility-administered medications for this visit.      Allergies   Allergen Reactions     Bacitracin      Bacitracin-Polymyxin B      Other reaction(s): Erythema     Liquid Adhesive Rash     Uses sensitive bandaids       Past medical, surgical, social and family history were reviewed and updated in EPIC.    EXAM:  /70   Pulse 64   Ht 1.67 m (5' 5.75\")   Wt 59.4 kg (131 lb)   BMI 21.31 kg/m     BMI: Body mass index is 21.31 kg/m .    Constitutional: Appearance: Well nourished, well developed alert, in no acute distress  Breasts: Inspection of Breasts:  No lymphadenopathy present    Palpation of Breasts and Axillae:  No masses present on palpation, no  breast tenderness    Axillary Lymph Nodes:  No lymphadenopathy present  Neurologic/Psychiatric:    Mental Status:  Oriented X3     Pelvic Exam:  External Genitalia:     LICHEN PLANUS BILATERALLY AT LABIA MINORA.   Vagina:     Normal vaginal vault without central or paravaginal defects, ATROPHIC  Bladder:     Nontender to palpation  Urethra:   Urethral Body:  Urethra palpation normal, urethra structural support normal   Urethral Meatus:  No erythema or lesions present  Cervix:     Appearance healthy, no lesions present, nontender to palpation, no bleeding present  Uterus:  "    Nontender to palpation, no masses present, position anteflexed, mobility: normal  Adnexa:     No adnexal tenderness present, no adnexal masses present  Perineum:     Perineum within normal limits, no evidence of trauma, no rashes or skin lesions present  Inguinal Lymph Nodes:     No lymphadenopathy present      Body mass index is 21.31 kg/m .     reports that she has never smoked. She has never used smokeless tobacco.      ASSESSMENT:  80 year old female with satisfactory annual exam.    ICD-10-CM    1. Encounter for gynecological examination without abnormal finding  Z01.419    2. Erosive lichen planus of vulva  L43.8        COUNSELING:   Reviewed preventive health counseling, as reflected in patient instructions       Regular exercise    PLAN/PATIENT INSTRUCTIONS:    Lichen planus of vulva but under control by Derm.   RTC 1 year    Jorje Reyes MD

## 2020-10-08 ENCOUNTER — OFFICE VISIT (OUTPATIENT)
Dept: OBGYN | Facility: CLINIC | Age: 80
End: 2020-10-08
Attending: OBSTETRICS & GYNECOLOGY
Payer: COMMERCIAL

## 2020-10-08 ENCOUNTER — ANCILLARY PROCEDURE (OUTPATIENT)
Dept: MAMMOGRAPHY | Facility: CLINIC | Age: 80
End: 2020-10-08
Attending: OBSTETRICS & GYNECOLOGY
Payer: COMMERCIAL

## 2020-10-08 VITALS
HEART RATE: 64 BPM | DIASTOLIC BLOOD PRESSURE: 70 MMHG | WEIGHT: 131 LBS | HEIGHT: 66 IN | BODY MASS INDEX: 21.05 KG/M2 | SYSTOLIC BLOOD PRESSURE: 118 MMHG

## 2020-10-08 DIAGNOSIS — Z12.31 VISIT FOR SCREENING MAMMOGRAM: ICD-10-CM

## 2020-10-08 DIAGNOSIS — L43.8 EROSIVE LICHEN PLANUS OF VULVA: ICD-10-CM

## 2020-10-08 DIAGNOSIS — Z01.419 ENCOUNTER FOR GYNECOLOGICAL EXAMINATION WITHOUT ABNORMAL FINDING: Primary | ICD-10-CM

## 2020-10-08 PROCEDURE — 77063 BREAST TOMOSYNTHESIS BI: CPT | Performed by: RADIOLOGY

## 2020-10-08 PROCEDURE — 77067 SCR MAMMO BI INCL CAD: CPT | Performed by: RADIOLOGY

## 2020-10-08 PROCEDURE — 99213 OFFICE O/P EST LOW 20 MIN: CPT | Performed by: OBSTETRICS & GYNECOLOGY

## 2020-10-08 ASSESSMENT — ANXIETY QUESTIONNAIRES
GAD7 TOTAL SCORE: 0
3. WORRYING TOO MUCH ABOUT DIFFERENT THINGS: NOT AT ALL
7. FEELING AFRAID AS IF SOMETHING AWFUL MIGHT HAPPEN: NOT AT ALL
1. FEELING NERVOUS, ANXIOUS, OR ON EDGE: NOT AT ALL
2. NOT BEING ABLE TO STOP OR CONTROL WORRYING: NOT AT ALL
6. BECOMING EASILY ANNOYED OR IRRITABLE: NOT AT ALL
IF YOU CHECKED OFF ANY PROBLEMS ON THIS QUESTIONNAIRE, HOW DIFFICULT HAVE THESE PROBLEMS MADE IT FOR YOU TO DO YOUR WORK, TAKE CARE OF THINGS AT HOME, OR GET ALONG WITH OTHER PEOPLE: NOT DIFFICULT AT ALL
5. BEING SO RESTLESS THAT IT IS HARD TO SIT STILL: NOT AT ALL

## 2020-10-08 ASSESSMENT — PATIENT HEALTH QUESTIONNAIRE - PHQ9
5. POOR APPETITE OR OVEREATING: NOT AT ALL
SUM OF ALL RESPONSES TO PHQ QUESTIONS 1-9: 2

## 2020-10-08 ASSESSMENT — MIFFLIN-ST. JEOR: SCORE: 1071.96

## 2020-10-09 ASSESSMENT — ANXIETY QUESTIONNAIRES: GAD7 TOTAL SCORE: 0

## 2021-01-15 ENCOUNTER — HEALTH MAINTENANCE LETTER (OUTPATIENT)
Age: 81
End: 2021-01-15

## 2021-09-04 ENCOUNTER — HEALTH MAINTENANCE LETTER (OUTPATIENT)
Age: 81
End: 2021-09-04

## 2021-12-30 NOTE — TELEPHONE ENCOUNTER
FUTURE VISIT INFORMATION      FUTURE VISIT INFORMATION:    Date: 4/18/2022    Time: 9AM    [place] [facility] [place]:    Referring provider:      Referring providers clinic:      Reason for visit/diagnosis  Hoarseness, last seen in 2017, per pt    RECORDS REQUESTED FROM:       Clinic name Comments Records Status Imaging Status   [facility] [place] 8/30/2017 note from Charito Parker SLP  12/9/2016 note from Dr Ciarra MCGEE

## 2022-02-19 ENCOUNTER — HEALTH MAINTENANCE LETTER (OUTPATIENT)
Age: 82
End: 2022-02-19

## 2022-04-15 ENCOUNTER — TELEPHONE (OUTPATIENT)
Dept: OTOLARYNGOLOGY | Facility: CLINIC | Age: 82
End: 2022-04-15
Payer: COMMERCIAL

## 2022-04-15 NOTE — TELEPHONE ENCOUNTER
Writer contacted patient to confirm appointment with Dr. Lafleur 4/18/22 9:00 AM. Patient confirmed.    Jonn Griffin, EMT

## 2022-04-18 ENCOUNTER — VIRTUAL VISIT (OUTPATIENT)
Dept: OTOLARYNGOLOGY | Facility: CLINIC | Age: 82
End: 2022-04-18
Payer: COMMERCIAL

## 2022-04-18 ENCOUNTER — PRE VISIT (OUTPATIENT)
Dept: OTOLARYNGOLOGY | Facility: CLINIC | Age: 82
End: 2022-04-18

## 2022-04-18 ENCOUNTER — OFFICE VISIT (OUTPATIENT)
Dept: OTOLARYNGOLOGY | Facility: CLINIC | Age: 82
End: 2022-04-18
Payer: COMMERCIAL

## 2022-04-18 ENCOUNTER — DOCUMENTATION ONLY (OUTPATIENT)
Dept: OTOLARYNGOLOGY | Facility: CLINIC | Age: 82
End: 2022-04-18

## 2022-04-18 VITALS — BODY MASS INDEX: 21.83 KG/M2 | WEIGHT: 131 LBS | HEIGHT: 65 IN

## 2022-04-18 DIAGNOSIS — J38.7 LESION OF LARYNX: ICD-10-CM

## 2022-04-18 DIAGNOSIS — R49.0 DYSPHONIA: Primary | ICD-10-CM

## 2022-04-18 PROCEDURE — 31579 LARYNGOSCOPY TELESCOPIC: CPT | Performed by: OTOLARYNGOLOGY

## 2022-04-18 PROCEDURE — 92524 BEHAVRAL QUALIT ANALYS VOICE: CPT | Mod: GN | Performed by: SPEECH-LANGUAGE PATHOLOGIST

## 2022-04-18 PROCEDURE — 99204 OFFICE O/P NEW MOD 45 MIN: CPT | Mod: 25 | Performed by: OTOLARYNGOLOGY

## 2022-04-18 RX ORDER — AMOXICILLIN 500 MG/1
TABLET, FILM COATED ORAL
COMMUNITY
Start: 2022-03-11

## 2022-04-18 RX ORDER — ATORVASTATIN CALCIUM 20 MG/1
20 TABLET, FILM COATED ORAL
COMMUNITY
Start: 2021-11-01 | End: 2024-02-25

## 2022-04-18 RX ORDER — ALENDRONATE SODIUM 70 MG/1
70 TABLET ORAL
Status: ON HOLD | COMMUNITY
Start: 2020-12-28 | End: 2024-02-26

## 2022-04-18 ASSESSMENT — PAIN SCALES - GENERAL: PAINLEVEL: NO PAIN (0)

## 2022-04-18 NOTE — PROGRESS NOTES
Order for CT scan faxed to [facility] in [facility]. Fax number [phone]. 4 pgs faxed.

## 2022-04-18 NOTE — LETTER
4/18/2022      RE: Kate Lima  [address] [id number]       Dear Colleague:    I had the pleasure of meeting Kate Lima in consultation at the [facility] of the [facility] on a self-referred basis, for evaluation of dysphonia. The note from our visit follows. Speech recognition software may have been used in the documentation below; input is reviewed before signature to the best of my ability. I appreciate the opportunity to participate in the care of this pleasant patient.    Please feel free to contact me with any questions.    Sincerely yours,      Almaz Lafleur M.D., M.P.H.  , Laryngology  Director, [facility]  Otolaryngology- Head & Neck Surgery  [phone]          =====    HISTORY OF PRESENT ILLNESS:   Kate Lima is a pleasant 81-year-old female who presents with a long history of dysphonia.       Voice  She states she is a cancer risk so she wanted to come back in for a check up.    She was last seen in 2016, and at that time, she had respiratory/phonatory incoordination and perhaps a very subtle bowing.  She completed a course of speech therapy with Charito Parker MM, MA, CCC-SLP with modest improvement. She was also seen in 2013 with similar findings.    Hoarseness is often present, and it bothers her sometimes but not very much. She is mostly focused on making sure she does not have cancer. She notes that she has a dry mouth that can make things worse.    She had multiple surgeries including spinal fusion in 2018. No major voice changes at that time. Still gets steroid shots into her back.    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 She just had a tooth extraction.      Swallowing  No concerns.       Cough/Throat-clearing  No concerns.       Breathing  No concerns.       Throat discomfort  No concerns.       Reflux-type symptoms  Reflux is not a major  concern. She is not taking reflux medications.    Prior Epic/ outside/ records were reviewed for this visit, including:  [place] 12/9/16  Charito Parker MM, MA, CCC-SLP 3/8/17    MEDICATIONS:     Current Outpatient Medications   Medication Sig Dispense Refill     acetaminophen (TYLENOL) 500 MG tablet Patient states she takes 6 tabs daily       alendronate (FOSAMAX) 70 MG tablet Take 70 mg by mouth       amoxicillin (AMOXIL) 500 MG tablet TAKE 4 TABLETS BY MOUTH 1 HOUR BEFORE PROCEDURE       atorvastatin (LIPITOR) 20 MG tablet Take 20 mg by mouth       Calcium Carbonate-Vitamin D (CALCIUM 600+D PO) Take  by mouth daily.       cholecalciferol (VITAMIN  -D) 1000 UNITS capsule Take 1 capsule by mouth daily.       Acetaminophen 325 MG CAPS Take 325-650 mg by mouth every 4 hours as needed (Patient not taking: Reported on 4/18/2022)       atorvastatin (LIPITOR) 20 MG tablet Take 20 mg by mouth (Patient not taking: Reported on 4/18/2022)       fiber modular (NUTR[employer] FIBER) packet 3 times daily (with meals) (Patient not taking: Reported on 4/18/2022)       fluocinonide (LIDEX) 0.05 % ointment  (Patient not taking: Reported on 4/18/2022)       fluocinonide (LIDEX) 0.05 % solution apply to ears PRN per derm (Patient not taking: Reported on 4/18/2022)       hydrocortisone (WESTCORT) 0.2 % cream Apply  topically 2 times daily. (Patient not taking: Reported on 4/18/2022)       MUPIROCIN EX Externally apply  topically. (Patient not taking: Reported on 4/18/2022)       pimecrolimus (ELIDEL) 1 % external cream Apply  topically 2 times daily. (Patient not taking: Reported on 4/18/2022)       teriparatide, recombinant, (FORTEO) 600 MCG/2.4ML SOLN injection Inject 20 mcg Subcutaneous (Patient not taking: Reported on 4/18/2022)         ALLERGIES:    Allergies   Allergen Reactions     Bacitracin      Bacitracin-Polymyxin B      Other reaction(s): Erythema     Liquid Adhesive Rash     Uses sensitive bandaids       PAST MEDICAL HISTORY:    Past Medical History:   Diagnosis Date     Arthritis      Atrophic vaginitis      Glaucoma      Hoarseness 05/01/2013     Leukocytopenia      Lichen planus      Osteoporosis      Raynaud's disease      Reflux         PAST SURGICAL HISTORY:   Past Surgical History:   Procedure Laterality Date     ORTHOPEDIC SURGERY  2009    carpal tunnel surgery + new thumb joints     right and left thumb joints       SPINE SURGERY  10/25/2018    3 fusions in back       HABITS/SOCIAL HISTORY:    Social History     Tobacco Use     Smoking status: Never Smoker     Smokeless tobacco: Never Used   Substance Use Topics     Alcohol use: Yes     Alcohol/week: 0.0 standard drinks     Comment: 1-2 drinks a month         FAMILY HISTORY:    Family History   Problem Relation Age of Onset     Hypertension Mother      Depression Mother      Breast Cancer Mother      Cancer Father      Lung Cancer Father      Cancer Brother      Colon Cancer Other      Lung Cancer Brother      No Known Problems Sister      No Known Problems Maternal Grandfather      No Known Problems Paternal Grandmother     Noncontributory.    REVIEW OF SYSTEMS:  Comprehensive 11 point review of systems was reviewed. Positives are as noted below; pertinent findings are as noted in the HPI.     Patient Supplied Answers to Review of Systems  [facility] ROS 4/17/2022   Ears, Nose, Throat Hoarseness   Musculoskeletal Swollen joints   Hematologic Bleeding problems   Other Rash            PHYSICAL EXAMINATION:  General: The patient was alert and conversant, and in no acute distress.    Eyes: PERRL, conjunctiva and lids normal, sclera nonicteric.  Nose: Anterior rhinoscopy: no gross abnormalities. no  bleeding; no  mucopurulence; septum grossly normal, mild mucoid drainage and/or crusting.  Oral cavity/oropharynx: No masses or lesions. Dentition in fair condition. Floor of mouth and oral tongue soft to palpation. Tongue mobility and palate elevation intact and symmetric.  Ears: Normal  auricles, external auditory canals bilaterally. Visualized portions of tympanic membranes normal bilaterally.   Neck: No palpable cervical lymphadenopathy. There was no significant tenderness to palpation of the thyrohyoid space, which was not narrow. No obvious thyroid abnormality. Landmarks palpable.  Resp: Breathing comfortably, no stridor or stertor.  Neuro: Symmetric facial function. Other cranial nerves as documented above.  Psych: Normal affect, pleasant and cooperative.  Voice/speech: Mild dysphonia characterized by roughness and glottal charlton; frequent poor phonatory/respiratory coordination.  Extremities: No cyanosis, clubbing, or edema of the upper extremities.    Intake scores  Total Score for Last Patient-Answered VHI Questionnaire  VHI Total Score 12/9/2016   VHI Total Score 11     Total Score for Last Patient-Answered EAT Questionnaire  EAT Total Score 12/9/2016   EAT Total Score 3     Total Score for Last Patient-Answered CSI Questionnaire  CSI Total Score 12/9/2016   CSI Total Score 0             PROCEDURE:   Flexible fiberoptic laryngoscopy and laryngovideostroboscopy  Indications: This procedure was warranted to evaluate the patient's laryngeal anatomy and function. Risks, benefits, and alternatives were discussed.  Description: After written informed consent was obtained, a time-out was performed to confirm patient identity, procedure, and procedure site. Topical 3% lidocaine with 0.25% phenylephrine was applied to the nasal cavities. I performed the endoscopy and no complications were apparent. Continuous and stroboscopic light were utilized to assess routine phonation and variable frequency phonation.  Performed by: Almaz Lafleur MD MPH  SLP: Abilio Almaguer MM, MA, CCC-SLP   Findings: Normal nasopharynx. Normal base of tongue, valleculae, and epiglottis. Vocal fold mobility: right: normal; left: normal. Medial edges of the vocal folds: smooth and straight. No focal mucosal lesions were  observed on the true vocal folds. Glissade produced appropriate elongation. There was mild to moderate supraglottic recruitment with connected speech. Mucosa of false vocal folds, aryepiglottic folds, piriform sinuses, and posterior glottis unremarkable. Airway was patent. Response to the therapy probes was good. No focal lesions on NBI. Mildly increased prominence of submucosal whitish region, left anterior ventricle, without associated mucosal abnormality. Also visible on 2016 exam, but more prominent today, potentially due to more open ventricles now. On 2016 exam, similar finding on right anterior ventricle, which was not as well visualized today.    The addition of stroboscopy allowed evaluation of the mucosal wave.   Amplitude: right: normal; left: normal. Symmetry: intermittent symmetry. Closure pattern: complete. Closure plane: at glottic level. Phase distribution: normal.                            LABS:  Cr normal 7 months ago      IMPRESSION AND PLAN:   Kate Lima presents with a stable exam of the true vocal folds, no evidence of malignancy.     We incidentally noted that in the left anterior ventricle there is whitish region visible, which was also visible in 2013 and 2016 but is more prominent now. In 2016, she had a similar finding contralaterally, not as well visualized today. We dicussed that this may just be her baseline and may represent laryngeal cartilage that is better visualized now, but we could check imaging to further evaluate. She would like to do this. She is aware that imaging does require some minor radiation.    Plan:  1) CT scan of the neck with contrast. [facility].  2) Speech therapy if desired. The patient is currently busy with physical therapy for knee/back and is not very bothered by her voice, so this is lower priority for her at present.    I asked the patient to return if symptoms worsen. I appreciate the opportunity to participate in the care of  this patient.     Today's visit required additional screening time, PPE, and cleaning measures to allow for a safe in-person visit, due to the public health emergency.    I spent a total of 48 minutes on 4/18/2022 in chart review, review of tests, patient visit, documentation, care coordination, and/or discussion with other providers about the issues documented above, separate from any documented procedure(s).        Almaz Lafleur MD

## 2022-04-18 NOTE — LETTER
4/18/2022       RE: Kate Lima  [address] [facility] [id number]     Dear Colleague,    Thank you for referring your patient, Kate Lima, to the [facility]ICE [facility] [place] at [facility]. Please see a copy of my visit note below.    Kate Lima is a 81 year old female who is being evaluated via a billable video visit.      The patient has been notified and verbally consented to the following:     This video visit will be conducted between you and your provider.    Patient has opted to conduct today's video visit vs an in-person appointment, and is not able to attend due to possible exposure to COVID-19.      If during the course of the call the provider feels a video visit is not appropriate, you will not be charged for this service.    Call initiated at: 9:07  Type of Video Platform Used: L8 SmartLight  Location of provider: Residence  Location of patient: [facility]  Evaluation report    Clinician: Abilio Almaguer M.M., M.A., CCC/SLP  Seen in conjunction with: Dr. Lafleur  Referring physician:  Self  Patient: Kate Lima  Date of Visit: 4/18/2022    HISTORY  Chief complaint: Kate Lima is a 81 year old woman presenting today for evaluation of voice.    Chart Review: Patient has a previous history with our clinic, having been seen in 2013 and subsequently in 2016.  At the earlier appointment it was felt she presented with MTD and she followed up with speech therapy at [facility] with some improvement (though not full resolution).  Symptoms gradually worsened again, and when she was seen in 2016 it was felt that there was subtle / mild vocal fold bowing, and again behavioral intervention was recommended as primary modality. Patient completed 5 sessions with my colleague Charito Parker CCC-SLP most recent on 8/30/2017 with modest improvement per review of notes.    Patient wished to be seen today for  "an updated evaluation given ongoing voice changes.  She reports multiple surgeries since her last visit including a lumbar spinal fusion in 2018 and a more recent knee surgery in July of this year for which she is currently undergoing PT.     CURRENT SYMPTOMS PER PATIENT REPORT:  VOICE    Onset and inciting incident: Same as previously reported    Progression: gradually worsening but stable day to day    Improves with: early in the day, talking on the telephone, sips of water    Voice \"isn't very strong\"    Voice bothers her \"a little bit\"    Vocal demand:    modest    ADDITIONAL    Dry throat    Sips of water help    Patient denies significant dyspnea, dysphagia, cough and pain.     OTHER PERTINENT HISTORY    Unknown please see Dr. Lafleur's note for additional details    Past Medical History:   Diagnosis Date     Arthritis      Atrophic vaginitis      Glaucoma      Hoarseness 05/01/2013     Leukocytopenia      Lichen planus      Osteoporosis      Raynaud's disease      Reflux      Past Surgical History:   Procedure Laterality Date     ORTHOPEDIC SURGERY  2009    carpal tunnel surgery + new thumb joints     right and left thumb joints       SPINE SURGERY  10/25/2018    3 fusions in back       OBJECTIVE  PATIENT REPORTED MEASURES  Patient Supplied Answers To VHI Questionnaire  Voice Handicap Index (VHI-10) 4/18/2022   My voice makes it difficult for people to hear me 3   People have difficulty understanding me in a noisy room 2   My voice difficulties restrict my personal and social life.  0   I feel left out of conversations because of my voice 2   My voice problem causes me to lose income 0   I feel as though I have to strain to produce voice 2   The clarity of my voice is unpredictable 2   My voice problem upsets me 1   My voice makes me feel handicapped 0   People ask, \"What's wrong with your voice?\" 2   VHI-10 14     PERCEPTUAL EVALUATION (CPT 32664)  POSTURE / TENSION:     neck and shoulders    BREATHING: "     phonation is not coordinated with respiration    LARYNGEAL PALPATION:     no significant tenderness    VOICE:    Roughness: Mild to moderate Consistent    Breathiness: Minimal    Strain: Mild to moderate Consistent     MPT    /s/ - 9 seconds    /z/ - 20 seconds    Loudness    Conversational speech:  WNL    Projected speech:  Mildly reduced    Pitch:    Conversational speech:  WNL  ~208 Hz    Pitch glide:     Significant self-limited    Maximal support required, but functional access to loft registration    Mildly reduced access to low modal registration    Resonance:    Conversational speech:  laryngeal pharyngeal resonance    Singing vs. Speech:  Modest pitch accuracy in singing    CAPE-V Overall Severity:  38/100    COUGH/THROAT CLEARING:    Not observed    THERAPY PROBES: Improvement was elicited with use of forward resonant stimuli and coordination of respiration and phonation    LARYNGEAL EXAMINATION  Procedure: Flexible endoscopy with chip-tip technology with stroboscopy, left nostril; topical anesthesia with 3% Lidocaine and 0.25% phenylephrine was applied.   Performed by: Dr. Almaz Lafleur  The laryngeal and pharyngeal structures were evaluated for gross appearance, mobility, function, and focal lesions / abnormalities of the associated mucosa.  Stroboscopy was warranted to evaluate closure, symmetry, and vibratory characteristics of the vocal folds.  All findings were within normal limits with the exception of the following salient features:     Essentially healthy mucosa    Whitish subtle area of pale area at the left anterior ventricle, appears smooth    With running speech there is moderate 4 way constrictive supraglottic hyperfunction    Decreased hyperfunction in singing tasks, forward resonant sounds, and higher flow contexts    Stroboscopy demonstrates:    Consistent chasing asymmetry at all F0    Complete glottic closure    Essentially normal amplitude and mucosal wave    Intermittent  aperiodicity    The laryngeal exam was reviewed with Ms. Lima, and I provided pertinent explanations, as well as written and oral information.    ASSESSMENT / PLAN  IMPRESSIONS: Kate Lima is presenting today with longstanding voice changes that are gradually progressing over time.  Today's evaluation demonstrates Dysphonia (R49.0) in the context of an imbalance in function of the intrinsic and extrinsic muscles of the larynx and non-optimal phonatory respiratory coordination. Laryngeal evaluation shows essentially healthy mucosa with a small area of pale mucosa in the left anterior ventricle. This appears smooth and not overtly concerning, and appears to have been present on two previous exams though exact view angle could not be confirmed.  With regard to voice quality, perceptually voice is dominated by roughness and strain.  There is notable improvement with cues that coordinate respiration and phonation or focus on forward locus of resonance.     DETAILED RECOMMENDATIONS:     CT neck recommended by Dr. Lafleur relative to mucosal irregularity in the left ventricle, this will be completed at the patients convenience.    A course of speech therapy is warranted to address degraded voice quality given response to therapeutic probes; however, at this point the patient is not sufficiently bothered by her symptoms, and would like to defer.      She was encouraged to maintain contact with the clinic and re-engage if her needs or status change.      Today's appointment was evaluation only    This treatment plan was developed with the patient who agreed with the recommendations.    TOTAL SERVICE TIME: 35 minutes  EVALUATION OF VOICE AND RESONANCE (42616)  NO CHARGE FACILITY FEE (48672)        *this report was created in part through the use of computerized dictation software, and though reviewed following completion, some typographic errors may persist.  If there is confusion regarding any of this notes contents,  please contact me for clarification.*            Again, thank you for allowing me to participate in the care of your patient.      Sincerely,    Aguila Almaguer, SLP

## 2022-04-18 NOTE — PROGRESS NOTES
Kate Lima is a 81 year old female who is being evaluated via a billable video visit.      The patient has been notified and verbally consented to the following:     This video visit will be conducted between you and your provider.    Patient has opted to conduct today's video visit vs an in-person appointment, and is not able to attend due to possible exposure to COVID-19.      If during the course of the call the provider feels a video visit is not appropriate, you will not be charged for this service.    Call initiated at: 9:07  Type of Video Platform Used: SIVI  Location of provider: Residence  Location of patient: [facility]ICE [facility]  Evaluation report    Clinician: Abilio Almaguer M.M., M.A., CCC/SLP  Seen in conjunction with: Dr. Lafleur  Referring physician:  Self  Patient: Kate Lima  Date of Visit: 4/18/2022    HISTORY  Chief complaint: Kate Lima is a 81 year old woman presenting today for evaluation of voice.    Chart Review: Patient has a previous history with our clinic, having been seen in 2013 and subsequently in 2016.  At the earlier appointment it was felt she presented with MTD and she followed up with speech therapy at [facility] with some improvement (though not full resolution).  Symptoms gradually worsened again, and when she was seen in 2016 it was felt that there was subtle / mild vocal fold bowing, and again behavioral intervention was recommended as primary modality. Patient completed 5 sessions with my colleague Charito Parker CCC-SLP most recent on 8/30/2017 with modest improvement per review of notes.    Patient wished to be seen today for an updated evaluation given ongoing voice changes.  She reports multiple surgeries since her last visit including a lumbar spinal fusion in 2018 and a more recent knee surgery in July of this year for which she is currently undergoing PT.     CURRENT SYMPTOMS PER PATIENT REPORT:  VOICE    Onset and inciting  "incident: Same as previously reported    Progression: gradually worsening but stable day to day    Improves with: early in the day, talking on the telephone, sips of water    Voice \"isn't very strong\"    Voice bothers her \"a little bit\"    Vocal demand:    modest    ADDITIONAL    Dry throat    Sips of water help    Patient denies significant dyspnea, dysphagia, cough and pain.     OTHER PERTINENT HISTORY    Unknown please see Dr. Lafleur's note for additional details    Past Medical History:   Diagnosis Date     Arthritis      Atrophic vaginitis      Glaucoma      Hoarseness 05/01/2013     Leukocytopenia      Lichen planus      Osteoporosis      Raynaud's disease      Reflux      Past Surgical History:   Procedure Laterality Date     ORTHOPEDIC SURGERY  2009    carpal tunnel surgery + new thumb joints     right and left thumb joints       SPINE SURGERY  10/25/2018    3 fusions in back       OBJECTIVE  PATIENT REPORTED MEASURES  Patient Supplied Answers To VHI Questionnaire  Voice Handicap Index (VHI-10) 4/18/2022   My voice makes it difficult for people to hear me 3   People have difficulty understanding me in a noisy room 2   My voice difficulties restrict my personal and social life.  0   I feel left out of conversations because of my voice 2   My voice problem causes me to lose income 0   I feel as though I have to strain to produce voice 2   The clarity of my voice is unpredictable 2   My voice problem upsets me 1   My voice makes me feel handicapped 0   People ask, \"What's wrong with your voice?\" 2   VHI-10 14     PERCEPTUAL EVALUATION (CPT 89672)  POSTURE / TENSION:     neck and shoulders    BREATHING:     phonation is not coordinated with respiration    LARYNGEAL PALPATION:     no significant tenderness    VOICE:    Roughness: Mild to moderate Consistent    Breathiness: Minimal    Strain: Mild to moderate Consistent     MPT    /s/ - 9 seconds    /z/ - 20 seconds    Loudness    Conversational speech:  " WNL    Projected speech:  Mildly reduced    Pitch:    Conversational speech:  WNL  ~208 Hz    Pitch glide:     Significant self-limited    Maximal support required, but functional access to loft registration    Mildly reduced access to low modal registration    Resonance:    Conversational speech:  laryngeal pharyngeal resonance    Singing vs. Speech:  Modest pitch accuracy in singing    CAPE-V Overall Severity:  38/100    COUGH/THROAT CLEARING:    Not observed    THERAPY PROBES: Improvement was elicited with use of forward resonant stimuli and coordination of respiration and phonation    LARYNGEAL EXAMINATION  Procedure: Flexible endoscopy with chip-tip technology with stroboscopy, left nostril; topical anesthesia with 3% Lidocaine and 0.25% phenylephrine was applied.   Performed by: Dr. Almaz Lafleur  The laryngeal and pharyngeal structures were evaluated for gross appearance, mobility, function, and focal lesions / abnormalities of the associated mucosa.  Stroboscopy was warranted to evaluate closure, symmetry, and vibratory characteristics of the vocal folds.  All findings were within normal limits with the exception of the following salient features:     Essentially healthy mucosa    Whitish subtle area of pale area at the left anterior ventricle, appears smooth    With running speech there is moderate 4 way constrictive supraglottic hyperfunction    Decreased hyperfunction in singing tasks, forward resonant sounds, and higher flow contexts    Stroboscopy demonstrates:    Consistent chasing asymmetry at all F0    Complete glottic closure    Essentially normal amplitude and mucosal wave    Intermittent aperiodicity    The laryngeal exam was reviewed with Ms. Lima, and I provided pertinent explanations, as well as written and oral information.    ASSESSMENT / PLAN  IMPRESSIONS: Kate Lima is presenting today with longstanding voice changes that are gradually progressing over time.  Today's evaluation  demonstrates Dysphonia (R49.0) in the context of an imbalance in function of the intrinsic and extrinsic muscles of the larynx and non-optimal phonatory respiratory coordination. Laryngeal evaluation shows essentially healthy mucosa with a small area of pale mucosa in the left anterior ventricle. This appears smooth and not overtly concerning, and appears to have been present on two previous exams though exact view angle could not be confirmed.  With regard to voice quality, perceptually voice is dominated by roughness and strain.  There is notable improvement with cues that coordinate respiration and phonation or focus on forward locus of resonance.     DETAILED RECOMMENDATIONS:     CT neck recommended by Dr. Lafleur relative to mucosal irregularity in the left ventricle, this will be completed at the patients convenience.    A course of speech therapy is warranted to address degraded voice quality given response to therapeutic probes; however, at this point the patient is not sufficiently bothered by her symptoms, and would like to defer.      She was encouraged to maintain contact with the clinic and re-engage if her needs or status change.      Today's appointment was evaluation only    This treatment plan was developed with the patient who agreed with the recommendations.    TOTAL SERVICE TIME: 35 minutes  EVALUATION OF VOICE AND RESONANCE (24707)  NO CHARGE FACILITY FEE (08333)    Abilio Almaguer M.M., M.A., CCC-SLP  Speech-Language Pathologist  Certificate of Vocology  [phone]    *this report was created in part through the use of computerized dictation software, and though reviewed following completion, some typographic errors may persist.  If there is confusion regarding any of this notes contents, please contact me for clarification.*

## 2022-04-18 NOTE — PROGRESS NOTES
Dear Colleague:    I had the pleasure of meeting Kate Lima in consultation at the [facility] of the [facility] on a self-referred basis, for evaluation of dysphonia. The note from our visit follows. Speech recognition software may have been used in the documentation below; input is reviewed before signature to the best of my ability. I appreciate the opportunity to participate in the care of this pleasant patient.    Please feel free to contact me with any questions.    Sincerely yours,      Almaz Lafleur M.D., M.P.H.  , Laryngology  Director, [facility]  Otolaryngology- Head & Neck Surgery  [phone]          =====    HISTORY OF PRESENT ILLNESS:   Kate Lima is a pleasant 81-year-old female who presents with a long history of dysphonia.       Voice  She states she is a cancer risk so she wanted to come back in for a check up.    She was last seen in 2016, and at that time, she had respiratory/phonatory incoordination and perhaps a very subtle bowing.  She completed a course of speech therapy with Charito Parker MM, MA, CCC-SLP with modest improvement. She was also seen in 2013 with similar findings.    Hoarseness is often present, and it bothers her sometimes but not very much. She is mostly focused on making sure she does not have cancer. She notes that she has a dry mouth that can make things worse.    She had multiple surgeries including spinal fusion in 2018. No major voice changes at that time. Still gets steroid shots into her back.    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 She just had a tooth extraction.      Swallowing  No concerns.       Cough/Throat-clearing  No concerns.       Breathing  No concerns.       Throat discomfort  No concerns.       Reflux-type symptoms  Reflux is not a major concern. She is not taking reflux medications.    Prior Epic/ outside/ records were  reviewed for this visit, including:  [place] 12/9/16  Charito Parker, MM, MA, CCC-SLP 3/8/17    MEDICATIONS:     Current Outpatient Medications   Medication Sig Dispense Refill     acetaminophen (TYLENOL) 500 MG tablet Patient states she takes 6 tabs daily       alendronate (FOSAMAX) 70 MG tablet Take 70 mg by mouth       amoxicillin (AMOXIL) 500 MG tablet TAKE 4 TABLETS BY MOUTH 1 HOUR BEFORE PROCEDURE       atorvastatin (LIPITOR) 20 MG tablet Take 20 mg by mouth       Calcium Carbonate-Vitamin D (CALCIUM 600+D PO) Take  by mouth daily.       cholecalciferol (VITAMIN  -D) 1000 UNITS capsule Take 1 capsule by mouth daily.       Acetaminophen 325 MG CAPS Take 325-650 mg by mouth every 4 hours as needed (Patient not taking: Reported on 4/18/2022)       atorvastatin (LIPITOR) 20 MG tablet Take 20 mg by mouth (Patient not taking: Reported on 4/18/2022)       fiber modular (NUTRISOURCE FIBER) packet 3 times daily (with meals) (Patient not taking: Reported on 4/18/2022)       fluocinonide (LIDEX) 0.05 % ointment  (Patient not taking: Reported on 4/18/2022)       fluocinonide (LIDEX) 0.05 % solution apply to ears PRN per derm (Patient not taking: Reported on 4/18/2022)       hydrocortisone (WESTCORT) 0.2 % cream Apply  topically 2 times daily. (Patient not taking: Reported on 4/18/2022)       MUPIROCIN EX Externally apply  topically. (Patient not taking: Reported on 4/18/2022)       pimecrolimus (ELIDEL) 1 % external cream Apply  topically 2 times daily. (Patient not taking: Reported on 4/18/2022)       teriparatide, recombinant, (FORTEO) 600 MCG/2.4ML SOLN injection Inject 20 mcg Subcutaneous (Patient not taking: Reported on 4/18/2022)         ALLERGIES:    Allergies   Allergen Reactions     Bacitracin      Bacitracin-Polymyxin B      Other reaction(s): Erythema     Liquid Adhesive Rash     Uses sensitive bandaids       PAST MEDICAL HISTORY:   Past Medical History:   Diagnosis Date     Arthritis      Atrophic vaginitis       Glaucoma      Hoarseness 05/01/2013     Leukocytopenia      Lichen planus      Osteoporosis      Raynaud's disease      Reflux         PAST SURGICAL HISTORY:   Past Surgical History:   Procedure Laterality Date     ORTHOPEDIC SURGERY  2009    carpal tunnel surgery + new thumb joints     right and left thumb joints       SPINE SURGERY  10/25/2018    3 fusions in back       HABITS/SOCIAL HISTORY:    Social History     Tobacco Use     Smoking status: Never Smoker     Smokeless tobacco: Never Used   Substance Use Topics     Alcohol use: Yes     Alcohol/week: 0.0 standard drinks     Comment: 1-2 drinks a month         FAMILY HISTORY:    Family History   Problem Relation Age of Onset     Hypertension Mother      Depression Mother      Breast Cancer Mother      Cancer Father      Lung Cancer Father      Cancer Brother      Colon Cancer Other      Lung Cancer Brother      No Known Problems Sister      No Known Problems Maternal Grandfather      No Known Problems Paternal Grandmother     Noncontributory.    REVIEW OF SYSTEMS:  Comprehensive 11 point review of systems was reviewed. Positives are as noted below; pertinent findings are as noted in the HPI.     Patient Supplied Answers to Review of Systems   ENT ROS 4/17/2022   Ears, Nose, Throat Hoarseness   Musculoskeletal Swollen joints   Hematologic Bleeding problems   Other Rash            PHYSICAL EXAMINATION:  General: The patient was alert and conversant, and in no acute distress.    Eyes: PERRL, conjunctiva and lids normal, sclera nonicteric.  Nose: Anterior rhinoscopy: no gross abnormalities. no  bleeding; no  mucopurulence; septum grossly normal, mild mucoid drainage and/or crusting.  Oral cavity/oropharynx: No masses or lesions. Dentition in fair condition. Floor of mouth and oral tongue soft to palpation. Tongue mobility and palate elevation intact and symmetric.  Ears: Normal auricles, external auditory canals bilaterally. Visualized portions of tympanic  membranes normal bilaterally.   Neck: No palpable cervical lymphadenopathy. There was no significant tenderness to palpation of the thyrohyoid space, which was not narrow. No obvious thyroid abnormality. Landmarks palpable.  Resp: Breathing comfortably, no stridor or stertor.  Neuro: Symmetric facial function. Other cranial nerves as documented above.  Psych: Normal affect, pleasant and cooperative.  Voice/speech: Mild dysphonia characterized by roughness and glottal charlton; frequent poor phonatory/respiratory coordination.  Extremities: No cyanosis, clubbing, or edema of the upper extremities.    Intake scores  Total Score for Last Patient-Answered VHI Questionnaire  VHI Total Score 12/9/2016   VHI Total Score 11     Total Score for Last Patient-Answered EAT Questionnaire  EAT Total Score 12/9/2016   EAT Total Score 3     Total Score for Last Patient-Answered CSI Questionnaire  CSI Total Score 12/9/2016   CSI Total Score 0             PROCEDURE:   Flexible fiberoptic laryngoscopy and laryngovideostroboscopy  Indications: This procedure was warranted to evaluate the patient's laryngeal anatomy and function. Risks, benefits, and alternatives were discussed.  Description: After written informed consent was obtained, a time-out was performed to confirm patient identity, procedure, and procedure site. Topical 3% lidocaine with 0.25% phenylephrine was applied to the nasal cavities. I performed the endoscopy and no complications were apparent. Continuous and stroboscopic light were utilized to assess routine phonation and variable frequency phonation.  Performed by: Almaz Lafleur MD MPH  SLP: Abilio Almaguer MM, MA, CCC-SLP   Findings: Normal nasopharynx. Normal base of tongue, valleculae, and epiglottis. Vocal fold mobility: right: normal; left: normal. Medial edges of the vocal folds: smooth and straight. No focal mucosal lesions were observed on the true vocal folds. Glissade produced appropriate elongation. There  was mild to moderate supraglottic recruitment with connected speech. Mucosa of false vocal folds, aryepiglottic folds, piriform sinuses, and posterior glottis unremarkable. Airway was patent. Response to the therapy probes was good. No focal lesions on NBI. Mildly increased prominence of submucosal whitish region, left anterior ventricle, without associated mucosal abnormality. Also visible on 2016 exam, but more prominent today, potentially due to more open ventricles now. On 2016 exam, similar finding on right anterior ventricle, which was not as well visualized today.    The addition of stroboscopy allowed evaluation of the mucosal wave.   Amplitude: right: normal; left: normal. Symmetry: intermittent symmetry. Closure pattern: complete. Closure plane: at glottic level. Phase distribution: normal.                            LABS:  Cr normal 7 months ago      IMPRESSION AND PLAN:   Kate Lima presents with a stable exam of the true vocal folds, no evidence of malignancy.     We incidentally noted that in the left anterior ventricle there is whitish region visible, which was also visible in 2013 and 2016 but is more prominent now. In 2016, she had a similar finding contralaterally, not as well visualized today. We dicussed that this may just be her baseline and may represent laryngeal cartilage that is better visualized now, but we could check imaging to further evaluate. She would like to do this. She is aware that imaging does require some minor radiation.    Plan:  1) CT scan of the neck with contrast. [facility] in [facility].  2) Speech therapy if desired. The patient is currently busy with physical therapy for knee/back and is not very bothered by her voice, so this is lower priority for her at present.    I asked the patient to return if symptoms worsen. I appreciate the opportunity to participate in the care of this patient.     Today's visit required additional screening time, PPE, and  cleaning measures to allow for a safe in-person visit, due to the public health emergency.    I spent a total of 48 minutes on 4/18/2022 in chart review, review of tests, patient visit, documentation, care coordination, and/or discussion with other providers about the issues documented above, separate from any documented procedure(s).

## 2022-04-29 ENCOUNTER — TRANSFERRED RECORDS (OUTPATIENT)
Dept: HEALTH INFORMATION MANAGEMENT | Facility: CLINIC | Age: 82
End: 2022-04-29
Payer: COMMERCIAL

## 2022-05-02 ENCOUNTER — TELEPHONE (OUTPATIENT)
Dept: OTOLARYNGOLOGY | Facility: CLINIC | Age: 82
End: 2022-05-02
Payer: COMMERCIAL

## 2022-05-02 NOTE — TELEPHONE ENCOUNTER
Records Requested  05/02/22    Facility  [facility]  MRN: [id number]   Outcome 4/29/22 CT NECK report received and sent to scan. Sent a fax for images to be pushed     5/10/22 images received - Amay

## 2022-05-26 ENCOUNTER — LAB REQUISITION (OUTPATIENT)
Dept: LAB | Facility: CLINIC | Age: 82
End: 2022-05-26
Payer: COMMERCIAL

## 2022-05-26 DIAGNOSIS — L08.9 LOCAL INFECTION OF THE SKIN AND SUBCUTANEOUS TISSUE, UNSPECIFIED: ICD-10-CM

## 2022-05-26 PROCEDURE — 87070 CULTURE OTHR SPECIMN AEROBIC: CPT | Mod: ORL | Performed by: DERMATOLOGY

## 2022-05-28 LAB — BACTERIA WND CULT: NORMAL

## 2022-10-16 ENCOUNTER — HEALTH MAINTENANCE LETTER (OUTPATIENT)
Age: 82
End: 2022-10-16

## 2022-12-04 ENCOUNTER — HEALTH MAINTENANCE LETTER (OUTPATIENT)
Age: 82
End: 2022-12-04

## 2023-04-01 ENCOUNTER — HEALTH MAINTENANCE LETTER (OUTPATIENT)
Age: 83
End: 2023-04-01

## 2023-06-07 ENCOUNTER — TRANSCRIBE ORDERS (OUTPATIENT)
Dept: OTHER | Age: 83
End: 2023-06-07

## 2023-06-07 DIAGNOSIS — R49.0 MUSCLE TENSION DYSPHONIA: Primary | ICD-10-CM

## 2023-06-12 ENCOUNTER — THERAPY VISIT (OUTPATIENT)
Dept: SPEECH THERAPY | Facility: CLINIC | Age: 83
End: 2023-06-12
Attending: STUDENT IN AN ORGANIZED HEALTH CARE EDUCATION/TRAINING PROGRAM
Payer: COMMERCIAL

## 2023-06-12 DIAGNOSIS — R49.0 MUSCLE TENSION DYSPHONIA: Primary | ICD-10-CM

## 2023-06-12 PROCEDURE — 92524 BEHAVRAL QUALIT ANALYS VOICE: CPT | Mod: GN | Performed by: STUDENT IN AN ORGANIZED HEALTH CARE EDUCATION/TRAINING PROGRAM

## 2023-06-12 PROCEDURE — 92507 TX SP LANG VOICE COMM INDIV: CPT | Mod: GN | Performed by: STUDENT IN AN ORGANIZED HEALTH CARE EDUCATION/TRAINING PROGRAM

## 2023-06-12 NOTE — PROGRESS NOTES
"SPEECH LANGUAGE PATHOLOGY EVALUATION    See electronic medical record for Abuse and Falls Screening details.    Subjective      Presenting condition or subjective complaint: \"Hoarseness in my voice; reduced voice volume\" 81yo female presenting with 10+ year history of dysphonia that has been gradually worsening over time.  Pt has completed skilled speech therapy for this problem in the past, both with [facility] and more recently at the [facility].  She felt therapy was helpful at the time, but her voice has worsened since she was last seen for therapy.  Pt reports that her voice is hoarse and weak.  Her voice tends to sound better in the mornings and will worsen by evenings.  She notes that her  has difficulty hearing her at times.  She denies throat pain/discomfort but does endorse occasional choking.  She has had a barium swallow test in the past.  Her mouth and throat will often feel dry, and she tries to drink enough water to reduce the dryness.  PMH also significant for nutcracker esophagus.  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  Prior therapy history for the same diagnosis, illness or injury: Yes voice therapy several years ago at [facility]    PAST MEDICAL HISTORY:   Past Medical History:   Diagnosis Date     Arthritis      Atrophic vaginitis      Glaucoma      Hoarseness 05/01/2013     Leukocytopenia      Lichen planus      Osteoporosis      Raynaud's disease      Reflux        PAST SURGICAL HISTORY:   Past Surgical History:   Procedure Laterality Date     ORTHOPEDIC SURGERY  2009    carpal tunnel surgery + new thumb joints     right and left thumb joints       SPINE SURGERY  " 10/25/2018    3 fusions in back       Living Environment  Social support: With a significant other or spouse   Help at home: Home management tasks (cooking, cleaning); Home and Yard maintenance tasks  Equipment owned: Walker with wheels; Grab bars     Employment: No    Hobbies/Interests: [employer]; [facility] [employer] dining out    Patient goals for therapy: Have a stronger voice without hoarseness    Pain assessment: Pain denied     Objective     PERSONAL RATING/VOICE USE  Patient description of current voice quality: Pt reports that today is a typical day for her voice problems.    HYPERFUNCTION  Palpation of the thyrohyoid region: firm musculature, reported mild tenderness of the thyrohyoid region     VOICE PROFILE DURING CONVERSATION (1 min monologue & paragraph reading)  Voice quality: SPEECH: Consistent mild-moderate strain, near consistent mild-moderate roughness, intermittent mild breathiness, and intermittent mild pitch breaks.  SINGING: Consistent with speech.  THERAPY PROBES: Modest improvements in voice quality with forward resonance and semi-occluded vocal tract probes.   Voice quality severity rating continuum: 5 - mild-moderate   Breath control: tight, shallow, poor respiratory/phonation coordination   Breath control severity rating continuum: 5 - mild-moderate  Voice Use/Effort: pinched/squeezed larynx, throat push, pt rates her phonatory effort for speech as 4-5/10 (10 is maximum effort)   Voice Use/Effort severity rating continuum: 5 - mild-moderate  Fundamental frequency (Hz): 196 Hz (centered around G3)  Pitch/Frequency Description: pitch breaks   Pitch/Frequency severity rating continuum: 6 - mild  Volume: WNL, modest improvements in voice quality with loud phonation   Volume severity rating continuum: 7 - WNL  Neuromuscular Control: WNL   Neuromuscular Control severity rating continuum: 7 - WNL  Resonance: laryngeal pharyngeal focus resonance   Resonance severity rating  continuum: 5 - mild-moderate    CAPE-V Overall Severity: 36/100    ADDUCTION/ABDUCTION FUNCTION  Laryngeal diadokinetic speed: WFL  Laryngeal diadokinetic strength: strained   Laryngeal diadokinetic consistency: regular   Adduction/Abduction function scale: Age 66+, norm per sec: 4     VIBRATORY FUNCTION OF VOCAL FOLDS  Prolonged  ah  at mid pitch (sec): 16.6 seconds with consistent strain and roughness vs glottal charlton and no obvious pitch center  Vibratory Function of Vocal Folds Scale Norms: females 20 - 80 yrs: 10 - 22 secs   FUNCTIONAL LENGTHENERS/SHORTENERS (CT & TA muscles)  Pitch glides: lower pitches more dysphonic   Lowest pitch: Eb3 with increased roughness  Highest pitch: B4 with improved quality      Assessment & Plan   CLINICAL IMPRESSIONS   Medical Diagnosis: Muscle tension dysphonia    Treatment Diagnosis: Dysphonia   Impression/Assessment: Pt is a 82 year old female with voice complaints secondary to muscle tension dysphonia per previous laryngoscopies/videostroboscopies with ENT and SLP at [facility] and the [facility]. The following significant findings have been identified: mild-moderate dysphonia, characterized by roughness, breathiness, strain, pitch breaks, poor respiratory/phonatory coordination, increased dysphonia at lower pitches, laryngeal pharyngeal focus resonance, and increased phonatory effort with tight laryngeal musculature during phonation. Identified deficits interfere with their ability to communicate within the home or community as compared to previous level of function.    PLAN OF CARE  Treatment Interventions:  Voice    Prognosis to achieve stated therapy goals is good   Rehab potential is impacted by: current level of function, patient motivation, prior level of function    Long Term Goals   SLP Goal 1  Goal Identifier: Generalization  Goal Description: Patient will report a week of typical activities in which dysphonia and vocal effort do not exceed a level of 2 out of  10, 90% of the time, so that patient is able to meet her vocal demands for conversations with family and friends.  Target Date: 09/10/23  SLP Goal 2  Goal Identifier: Voice quality  Goal Description: In a 20-minute speech task, patient will demonstrate roughness, breathiness, strain, and pitch breaks that do not exceed a level of 2 out of 10, 90% of the time by SLP judgment, so that patient is able to meet her voice quality demands.  Target Date: 09/10/23  SLP Goal 3  Goal Identifier: Vocal function  Goal Description: In order to improve laryngeal strength, flexibility, and coordination for daily vocal tasks, patient will extend average maximum phonation time in exercise 4 of modified Vocal Function Exercises to 10 seconds with minimal intermittent roughness and strain when given min assist.  Target Date: 09/10/23  SLP Goal 4  Goal Identifier: Massage  Goal Description: Patient will learn, demonstrate, and implement use of circumlaryngeal massage exercises independently 1-2x per day, in order to promote reduced laryngeal discomfort and tension.  Target Date: 09/10/23      Frequency of Treatment: 1x/week  Duration of Treatment: 6 weeks with 1-2 monthly follow-ups     Recommended Referrals to Other Professionals: None, pt is already seeing PT at a different clinic  Education Assessment:   Learner/Method: Patient;Reading;Demonstration;No Barriers to Learning  Education Comments: SLP provided education regarding evaluation findings and proposed POC.  Therapy initiated today.    Risks and benefits of evaluation/treatment have been explained.   Patient/Family/caregiver agrees with Plan of Care.     Evaluation Time:     Voice Minutes (44411): 30      Signing Clinician: Shana Carney, SLP    ROSALIA Riggs ([employer]), M.A., CCC-SLP  Speech-Language Pathologist  [facility] Certificate of Vocology  [facility]  [phone]          [facility]                                                                                    OUTPATIENT SPEECH LANGUAGE PATHOLOGY      PLAN OF TREATMENT FOR OUTPATIENT REHABILITATION   Patient's Last Name, First Name, Kate Flores YOB: 1940   Provider's Name   [facility]   Medical Record No.  [id number]     Onset Date: 06/07/23 (order date) Start of Care Date: 06/12/23     Medical Diagnosis:  Muscle tension dysphonia      SLP Treatment Diagnosis: Dysphonia  Plan of Treatment  Frequency/Duration: 1x/week  / 6 weeks with 1-2 monthly follow-ups     Certification date from 06/12/23   To 09/10/23          See note for plan of treatment details and functional goals     Shana Carney, SLP                         I CERTIFY THE NEED FOR THESE SERVICES FURNISHED UNDER        THIS PLAN OF TREATMENT AND WHILE UNDER MY CARE .             Physician Signature               Date    X_____________________________________________________                        Referring Provider:  Albert Guo      Initial Assessment  See Epic Evaluation- 06/12/23

## 2023-07-20 ENCOUNTER — THERAPY VISIT (OUTPATIENT)
Dept: SPEECH THERAPY | Facility: CLINIC | Age: 83
End: 2023-07-20
Attending: STUDENT IN AN ORGANIZED HEALTH CARE EDUCATION/TRAINING PROGRAM
Payer: COMMERCIAL

## 2023-07-20 DIAGNOSIS — R49.0 MUSCLE TENSION DYSPHONIA: Primary | ICD-10-CM

## 2023-07-20 PROCEDURE — 92507 TX SP LANG VOICE COMM INDIV: CPT | Mod: GN | Performed by: STUDENT IN AN ORGANIZED HEALTH CARE EDUCATION/TRAINING PROGRAM

## 2023-07-25 ENCOUNTER — THERAPY VISIT (OUTPATIENT)
Dept: SPEECH THERAPY | Facility: CLINIC | Age: 83
End: 2023-07-25
Payer: COMMERCIAL

## 2023-07-25 DIAGNOSIS — R49.0 MUSCLE TENSION DYSPHONIA: Primary | ICD-10-CM

## 2023-07-25 PROCEDURE — 92507 TX SP LANG VOICE COMM INDIV: CPT | Mod: GN | Performed by: STUDENT IN AN ORGANIZED HEALTH CARE EDUCATION/TRAINING PROGRAM

## 2023-08-01 ENCOUNTER — THERAPY VISIT (OUTPATIENT)
Dept: SPEECH THERAPY | Facility: CLINIC | Age: 83
End: 2023-08-01
Payer: COMMERCIAL

## 2023-08-01 DIAGNOSIS — R49.0 MUSCLE TENSION DYSPHONIA: Primary | ICD-10-CM

## 2023-08-01 PROCEDURE — 92507 TX SP LANG VOICE COMM INDIV: CPT | Mod: GN | Performed by: STUDENT IN AN ORGANIZED HEALTH CARE EDUCATION/TRAINING PROGRAM

## 2023-08-08 ENCOUNTER — THERAPY VISIT (OUTPATIENT)
Dept: SPEECH THERAPY | Facility: CLINIC | Age: 83
End: 2023-08-08
Payer: COMMERCIAL

## 2023-08-08 DIAGNOSIS — R49.0 MUSCLE TENSION DYSPHONIA: Primary | ICD-10-CM

## 2023-08-08 PROCEDURE — 92507 TX SP LANG VOICE COMM INDIV: CPT | Mod: GN | Performed by: STUDENT IN AN ORGANIZED HEALTH CARE EDUCATION/TRAINING PROGRAM

## 2023-09-05 ENCOUNTER — THERAPY VISIT (OUTPATIENT)
Dept: SPEECH THERAPY | Facility: CLINIC | Age: 83
End: 2023-09-05
Payer: COMMERCIAL

## 2023-09-05 DIAGNOSIS — R49.0 MUSCLE TENSION DYSPHONIA: Primary | ICD-10-CM

## 2023-09-05 PROCEDURE — 92507 TX SP LANG VOICE COMM INDIV: CPT | Mod: GN | Performed by: STUDENT IN AN ORGANIZED HEALTH CARE EDUCATION/TRAINING PROGRAM

## 2023-11-08 ENCOUNTER — THERAPY VISIT (OUTPATIENT)
Dept: SPEECH THERAPY | Facility: CLINIC | Age: 83
End: 2023-11-08
Payer: COMMERCIAL

## 2023-11-08 DIAGNOSIS — R49.0 MUSCLE TENSION DYSPHONIA: Primary | ICD-10-CM

## 2023-11-08 PROCEDURE — 92507 TX SP LANG VOICE COMM INDIV: CPT | Mod: GN | Performed by: STUDENT IN AN ORGANIZED HEALTH CARE EDUCATION/TRAINING PROGRAM

## 2023-11-08 NOTE — PROGRESS NOTES
[facility]                                                                                   OUTPATIENT SPEECH LANGUAGE PATHOLOGY    PLAN OF TREATMENT FOR OUTPATIENT REHABILITATION   Patient's Last Name, First Name, Kate Flores YOB: 1940   Provider's Name   [facility]   Medical Record No.  [id number]     Onset Date: 06/07/23 (order date) Start of Care Date: 06/12/23     Medical Diagnosis:  Muscle tension dysphonia      SLP Treatment Diagnosis: Dysphonia  Plan of Treatment  Frequency/Duration: 1x/month  / 3 months     Certification date from 09/11/23   To 12/10/23          See note for plan of treatment details and functional goals     Shana Carney, SLP                         I CERTIFY THE NEED FOR THESE SERVICES FURNISHED UNDER        THIS PLAN OF TREATMENT AND WHILE UNDER MY CARE .             Physician Signature               Date    X_____________________________________________________                    Referring Provider:  Albert Guo      Initial Assessment  See Epic Evaluation- 06/12/23             Speech-Language Pathology Department   PROGRESS NOTE AND RE-CERTIFICATION  [facility]    11/08/23 0500   Appointment Info   Treating Provider Shana Carney MA, CCC-SLP   Visits Used 7   Medical Diagnosis Muscle tension dysphonia   SLP Tx Diagnosis Dysphonia   Quick Adds Certification   Progress Note/Certification   Start Of Care Date 06/12/23   Onset Of Illness/injury Or Date Of Surgery 06/07/23  (order date)   Therapy Frequency 1x/month   Predicted Duration 3 months   Certification date from 09/11/23   Certification date to 12/10/23   Progress Note Completed Date 06/12/23   Subjective Report   Subjective Report Pt reports that her voice has been worse in the past couple of weeks and that people have been telling her that they can't hear her.  She reports that she is  dealing with several different health issues right now and hasn't been sleeping well.  She is concerned that she has throat cancer.   SLP Goals   SLP Goals 1;2;3;4   SLP Goal 1   Goal Identifier Generalization   Goal Description Patient will report a week of typical activities in which dysphonia and vocal effort do not exceed a level of 2 out of 10, 90% of the time, so that patient is able to meet her vocal demands for conversations with family and friends.   Goal Progress Limited progress especially since last session, possibly d/t increased stress/fatigue from other medical problems and lack of consistent home practice of therapy exercises.  Pt feels that her symptoms have worsened in the past two weeks.  Continue goal.   Target Date 09/10/23  (extend to 12/10/2023)   SLP Goal 2   Goal Identifier Voice quality   Goal Description In a 20-minute speech task, patient will demonstrate roughness, breathiness, strain, and pitch breaks that do not exceed a level of 2 out of 10, 90% of the time by SLP judgment, so that patient is able to meet her voice quality demands.   Goal Progress Variable progress, goal not met.  Patient has demonstrated modest improvements in voice quality with therapy techniques, with limited carryover to conversation.  She demonstrates voice quality characterized by consistent moderate strain and roughness with poor respiratory/phonatory coordination and mildly reduced volume independently.  Voice quality improves to consistent mild breathiness with intermittent mild-moderate roughness and strain and mildly reduced volume with mod-max cues/models from SLP to use voice techniques in session.  Continue goal.   Target Date 09/10/23  (extend to 12/10/2023)   SLP Goal 3   Goal Identifier Vocal function   Goal Description In order to improve laryngeal strength, flexibility, and coordination for daily vocal tasks, patient will extend average maximum phonation time in exercise 4 of modified Vocal Function  Exercises to 10 seconds with minimal intermittent roughness and strain when given min assist.   Goal Progress Goal not targeted this reporting period.  Continue goal.   Target Date 09/10/23  (extend to 12/10/2023)   SLP Goal 4   Goal Identifier Massage   Goal Description Patient will learn, demonstrate, and implement use of circumlaryngeal massage exercises independently 1-2x per day, in order to promote reduced laryngeal discomfort and tension.   Goal Progress Progressing, not met.  Patient benefits from at least min-mod cues/models from SLP to complete circumlaryngeal massage exercises in session and has not been practicing these exercises at home recently.  Continue goal.   Target Date 09/10/23  (extend to 12/10/2023)   Treatment Interventions (SLP)   Treatment Interventions Treatment Speech/Lang/Voice   Treatment Speech/Lang/Voice   Treatment of Speech, Language, Voice Communication&/or Auditory Processing (26661) 45 Minutes   Speech/Lang/Voice Speech/Lang/Voice 2   Speech/Lang/Voice 1 Voice   Speech/Lang/Voice 1 - Details Pt demonstrated previously trained SOVT humming exercises as a warm-up given mod cues/models from SLP.  Pt demonstrating ongoing hoarseness with SOVT humming and forward resonance techniques in familiar M sentences today despite mod cues/models from SLP.  SLP trained techniques to promote improved breath flow during phonation for speech.  Spacious speech/yawn-sigh techniques and speech material with aspirate onsets were somewhat facilitating.  Pt progressed from sounds to H sentences in flow and speech modes with modest improvements in voice quality given mod-max cues/models from SLP.  Cues to take adequate volume inspirations and to reduce abdominal squeeze on phonation initiation continued to be most facilitating today.  SLP answered pt's questions about potential causes of worsening voice symptoms, with SLP noting that worsening respiratory/phonatory coordination from stress/fatigue d/t  "recent medical problems as well as limited HEP completion as likely causes.  SLP advised pt that she can always return to ENT for repeat laryngoscopy if she feels something is wrong in her throat.  Pt verbalized understanding.   Speech/Lang/Voice 2 Massage   Speech/Lang/Voice 2 - Details Pt demonstrated previously trained circumlaryngeal massage exercises given min-mod cues/models from SLP.  Pt reports that her throat feels \"okay\" after completing these exercises.   Skilled Intervention Demonstrated voice exercises;Provided feedback on performance of tasks;Facilitated respiratory, laryngeal, oral integration   Patient Response/Progress Pt reporting worsening symptoms in past two weeks.  Please see goal information above for more details.  Pt is stimulable for improvements in voice quality using previously trained and new voice techniques given mod-max cues/models from SLP.   Education   Learner/Method Patient;Reading;Demonstration;No Barriers to Learning   Education Comments Session targets, performance, rationale behind therapy exercises, answered questions about potential triggers for worsening symptoms and when to return to ENT, HEP   Plan   Home program Daily practice of voice and massage exercises   Updates to plan of care Pt wishes to follow-up with ENT and will return to skilled speech therapy based on recommendations from that visit.   Plan for next session SLP: review homework, continue to train voice techniques in sentences of increasing length as able   Total Session Time   Total Treatment Time (sum of timed and untimed services) 45       PLAN  Continue therapy per current plan of care.  Pt wishes to return to ENT for follow-up and will schedule additional SLP sessions based on findings at that visit.    Beginning/End Dates of Progress Note Reporting Period:  06/12/23  to 11/08/2023    Referring Provider:  ROSALIA Grajeda (thee), M.A., CCC-SLP  Speech-Language " Pathologist  [facility] Certificate of Vocology  [facility]  [phone]

## 2024-01-13 ENCOUNTER — HEALTH MAINTENANCE LETTER (OUTPATIENT)
Age: 84
End: 2024-01-13

## 2024-02-25 ENCOUNTER — APPOINTMENT (OUTPATIENT)
Dept: CT IMAGING | Facility: CLINIC | Age: 84
DRG: 309 | End: 2024-02-25
Attending: EMERGENCY MEDICINE
Payer: COMMERCIAL

## 2024-02-25 ENCOUNTER — HOSPITAL ENCOUNTER (INPATIENT)
Facility: CLINIC | Age: 84
LOS: 2 days | Discharge: HOME | DRG: 309 | End: 2024-02-27
Attending: EMERGENCY MEDICINE | Admitting: HOSPITALIST
Payer: COMMERCIAL

## 2024-02-25 DIAGNOSIS — R56.9 SEIZURE (H): ICD-10-CM

## 2024-02-25 DIAGNOSIS — I48.92 ATRIAL FLUTTER WITH RAPID VENTRICULAR RESPONSE (H): ICD-10-CM

## 2024-02-25 PROBLEM — R57.0 CARDIOGENIC SHOCK (H): Status: ACTIVE | Noted: 2024-02-25

## 2024-02-25 LAB
ANION GAP SERPL CALCULATED.3IONS-SCNC: 13 MMOL/L (ref 7–15)
ATRIAL RATE - MUSE: 66 BPM
ATRIAL RATE - MUSE: NORMAL BPM
ATRIAL RATE - MUSE: NORMAL BPM
BUN SERPL-MCNC: 21.1 MG/DL (ref 8–23)
CALCIUM SERPL-MCNC: 9.8 MG/DL (ref 8.8–10.2)
CHLORIDE SERPL-SCNC: 102 MMOL/L (ref 98–107)
CREAT SERPL-MCNC: 0.71 MG/DL (ref 0.51–0.95)
DEPRECATED HCO3 PLAS-SCNC: 24 MMOL/L (ref 22–29)
DIASTOLIC BLOOD PRESSURE - MUSE: NORMAL MMHG
EGFRCR SERPLBLD CKD-EPI 2021: 83 ML/MIN/1.73M2
GLUCOSE SERPL-MCNC: 97 MG/DL (ref 70–99)
HOLD SPECIMEN: NORMAL
HOLD SPECIMEN: NORMAL
INTERPRETATION ECG - MUSE: NORMAL
MAGNESIUM SERPL-MCNC: 2 MG/DL (ref 1.7–2.3)
P AXIS - MUSE: 81 DEGREES
P AXIS - MUSE: NORMAL DEGREES
P AXIS - MUSE: NORMAL DEGREES
POTASSIUM SERPL-SCNC: 4.3 MMOL/L (ref 3.4–5.3)
PR INTERVAL - MUSE: 188 MS
PR INTERVAL - MUSE: NORMAL MS
PR INTERVAL - MUSE: NORMAL MS
QRS DURATION - MUSE: 110 MS
QRS DURATION - MUSE: 84 MS
QRS DURATION - MUSE: 98 MS
QT - MUSE: 300 MS
QT - MUSE: 310 MS
QT - MUSE: 436 MS
QTC - MUSE: 457 MS
QTC - MUSE: 474 MS
QTC - MUSE: 477 MS
R AXIS - MUSE: 24 DEGREES
R AXIS - MUSE: 38 DEGREES
R AXIS - MUSE: 9 DEGREES
SODIUM SERPL-SCNC: 139 MMOL/L (ref 135–145)
SYSTOLIC BLOOD PRESSURE - MUSE: NORMAL MMHG
T AXIS - MUSE: 13 DEGREES
T AXIS - MUSE: 54 DEGREES
T AXIS - MUSE: 67 DEGREES
TROPONIN T SERPL HS-MCNC: 16 NG/L
TSH SERPL DL<=0.005 MIU/L-ACNC: 2.09 UIU/ML (ref 0.3–4.2)
VENTRICULAR RATE- MUSE: 141 BPM
VENTRICULAR RATE- MUSE: 152 BPM
VENTRICULAR RATE- MUSE: 66 BPM

## 2024-02-25 PROCEDURE — 210N000002 HC R&B HEART CARE

## 2024-02-25 PROCEDURE — 250N000009 HC RX 250: Performed by: EMERGENCY MEDICINE

## 2024-02-25 PROCEDURE — 84484 ASSAY OF TROPONIN QUANT: CPT | Performed by: EMERGENCY MEDICINE

## 2024-02-25 PROCEDURE — 96365 THER/PROPH/DIAG IV INF INIT: CPT | Mod: 59

## 2024-02-25 PROCEDURE — 5A2204Z RESTORATION OF CARDIAC RHYTHM, SINGLE: ICD-10-PCS | Performed by: EMERGENCY MEDICINE

## 2024-02-25 PROCEDURE — 92960 CARDIOVERSION ELECTRIC EXT: CPT

## 2024-02-25 PROCEDURE — 96375 TX/PRO/DX INJ NEW DRUG ADDON: CPT

## 2024-02-25 PROCEDURE — 71275 CT ANGIOGRAPHY CHEST: CPT

## 2024-02-25 PROCEDURE — 250N000011 HC RX IP 250 OP 636: Performed by: EMERGENCY MEDICINE

## 2024-02-25 PROCEDURE — 93005 ELECTROCARDIOGRAM TRACING: CPT

## 2024-02-25 PROCEDURE — 250N000011 HC RX IP 250 OP 636

## 2024-02-25 PROCEDURE — 36415 COLL VENOUS BLD VENIPUNCTURE: CPT | Performed by: EMERGENCY MEDICINE

## 2024-02-25 PROCEDURE — 258N000003 HC RX IP 258 OP 636: Performed by: EMERGENCY MEDICINE

## 2024-02-25 PROCEDURE — 99285 EMERGENCY DEPT VISIT HI MDM: CPT | Mod: 25

## 2024-02-25 PROCEDURE — 70450 CT HEAD/BRAIN W/O DYE: CPT

## 2024-02-25 PROCEDURE — 80048 BASIC METABOLIC PNL TOTAL CA: CPT | Performed by: EMERGENCY MEDICINE

## 2024-02-25 PROCEDURE — 3E033XZ INTRODUCTION OF VASOPRESSOR INTO PERIPHERAL VEIN, PERCUTANEOUS APPROACH: ICD-10-PCS | Performed by: EMERGENCY MEDICINE

## 2024-02-25 PROCEDURE — 84443 ASSAY THYROID STIM HORMONE: CPT | Performed by: EMERGENCY MEDICINE

## 2024-02-25 PROCEDURE — 82607 VITAMIN B-12: CPT | Performed by: HOSPITALIST

## 2024-02-25 PROCEDURE — 96361 HYDRATE IV INFUSION ADD-ON: CPT

## 2024-02-25 PROCEDURE — 83735 ASSAY OF MAGNESIUM: CPT | Performed by: EMERGENCY MEDICINE

## 2024-02-25 PROCEDURE — 96366 THER/PROPH/DIAG IV INF ADDON: CPT

## 2024-02-25 PROCEDURE — 99153 MOD SED SAME PHYS/QHP EA: CPT

## 2024-02-25 PROCEDURE — 85025 COMPLETE CBC W/AUTO DIFF WBC: CPT | Performed by: EMERGENCY MEDICINE

## 2024-02-25 PROCEDURE — 99152 MOD SED SAME PHYS/QHP 5/>YRS: CPT

## 2024-02-25 RX ORDER — VALSARTAN 160 MG/1
160 TABLET ORAL AT BEDTIME
COMMUNITY

## 2024-02-25 RX ORDER — CICLOPIROX 80 MG/ML
SOLUTION TOPICAL AT BEDTIME
Status: ON HOLD | COMMUNITY
End: 2024-02-26

## 2024-02-25 RX ORDER — LORAZEPAM 2 MG/ML
2 INJECTION INTRAMUSCULAR ONCE
Status: COMPLETED | OUTPATIENT
Start: 2024-02-25 | End: 2024-02-25

## 2024-02-25 RX ORDER — ROSUVASTATIN CALCIUM 5 MG/1
5 TABLET, COATED ORAL AT BEDTIME
COMMUNITY

## 2024-02-25 RX ORDER — ADENOSINE 3 MG/ML
6 INJECTION, SOLUTION INTRAVENOUS ONCE
Status: DISCONTINUED | OUTPATIENT
Start: 2024-02-25 | End: 2024-02-25

## 2024-02-25 RX ORDER — ROPIVACAINE IN 0.9% SOD CHL/PF 0.1 %
.03-.125 PLASTIC BAG, INJECTION (ML) EPIDURAL CONTINUOUS
Status: DISCONTINUED | OUTPATIENT
Start: 2024-02-25 | End: 2024-02-26

## 2024-02-25 RX ORDER — FENTANYL CITRATE-0.9 % NACL/PF 10 MCG/ML
100 PLASTIC BAG, INJECTION (ML) INTRAVENOUS ONCE
Status: COMPLETED | OUTPATIENT
Start: 2024-02-25 | End: 2024-02-25

## 2024-02-25 RX ORDER — LORAZEPAM 2 MG/ML
INJECTION INTRAMUSCULAR
Status: COMPLETED
Start: 2024-02-25 | End: 2024-02-25

## 2024-02-25 RX ORDER — IOPAMIDOL 755 MG/ML
56 INJECTION, SOLUTION INTRAVASCULAR ONCE
Status: COMPLETED | OUTPATIENT
Start: 2024-02-25 | End: 2024-02-25

## 2024-02-25 RX ORDER — LORAZEPAM 2 MG/ML
2 INJECTION INTRAMUSCULAR
Status: DISCONTINUED | OUTPATIENT
Start: 2024-02-25 | End: 2024-02-27 | Stop reason: HOSPADM

## 2024-02-25 RX ADMIN — IOPAMIDOL 56 ML: 755 INJECTION, SOLUTION INTRAVENOUS at 20:03

## 2024-02-25 RX ADMIN — SODIUM CHLORIDE 1000 ML: 9 INJECTION, SOLUTION INTRAVENOUS at 19:36

## 2024-02-25 RX ADMIN — SODIUM CHLORIDE 1000 ML: 9 INJECTION, SOLUTION INTRAVENOUS at 22:03

## 2024-02-25 RX ADMIN — Medication 100 MCG: at 19:43

## 2024-02-25 RX ADMIN — LORAZEPAM 2 MG: 2 INJECTION INTRAMUSCULAR; INTRAVENOUS at 19:51

## 2024-02-25 RX ADMIN — LORAZEPAM 2 MG: 2 INJECTION INTRAMUSCULAR; INTRAVENOUS at 19:50

## 2024-02-25 RX ADMIN — NOREPINEPHRINE BITARTRATE 0.08 MCG/KG/MIN: 0.02 INJECTION, SOLUTION INTRAVENOUS at 20:15

## 2024-02-25 RX ADMIN — AMIODARONE HYDROCHLORIDE 150 MG: 1.5 INJECTION, SOLUTION INTRAVENOUS at 20:34

## 2024-02-25 RX ADMIN — SODIUM CHLORIDE 83 ML: 9 INJECTION, SOLUTION INTRAVENOUS at 20:03

## 2024-02-25 ASSESSMENT — ACTIVITIES OF DAILY LIVING (ADL)
ADLS_ACUITY_SCORE: 35

## 2024-02-26 LAB
ANION GAP SERPL CALCULATED.3IONS-SCNC: 9 MMOL/L (ref 7–15)
BASOPHILS # BLD AUTO: 0 10E3/UL (ref 0–0.2)
BASOPHILS # BLD AUTO: 0 10E3/UL (ref 0–0.2)
BASOPHILS NFR BLD AUTO: 0 %
BASOPHILS NFR BLD AUTO: 1 %
BUN SERPL-MCNC: 13.7 MG/DL (ref 8–23)
CALCIUM SERPL-MCNC: 8.1 MG/DL (ref 8.8–10.2)
CHLORIDE SERPL-SCNC: 109 MMOL/L (ref 98–107)
CREAT SERPL-MCNC: 0.52 MG/DL (ref 0.51–0.95)
DEPRECATED HCO3 PLAS-SCNC: 23 MMOL/L (ref 22–29)
EGFRCR SERPLBLD CKD-EPI 2021: >90 ML/MIN/1.73M2
EOSINOPHIL # BLD AUTO: 0 10E3/UL (ref 0–0.7)
EOSINOPHIL # BLD AUTO: 0 10E3/UL (ref 0–0.7)
EOSINOPHIL NFR BLD AUTO: 1 %
EOSINOPHIL NFR BLD AUTO: 1 %
ERYTHROCYTE [DISTWIDTH] IN BLOOD BY AUTOMATED COUNT: 13.2 % (ref 10–15)
ERYTHROCYTE [DISTWIDTH] IN BLOOD BY AUTOMATED COUNT: 13.2 % (ref 10–15)
FLUAV RNA SPEC QL NAA+PROBE: NEGATIVE
FLUBV RNA RESP QL NAA+PROBE: NEGATIVE
GLUCOSE SERPL-MCNC: 91 MG/DL (ref 70–99)
HCT VFR BLD AUTO: 31 % (ref 35–47)
HCT VFR BLD AUTO: 39 % (ref 35–47)
HGB BLD-MCNC: 10.1 G/DL (ref 11.7–15.7)
HGB BLD-MCNC: 12.6 G/DL (ref 11.7–15.7)
IMM GRANULOCYTES # BLD: 0 10E3/UL
IMM GRANULOCYTES # BLD: 0 10E3/UL
IMM GRANULOCYTES NFR BLD: 0 %
IMM GRANULOCYTES NFR BLD: 0 %
LYMPHOCYTES # BLD AUTO: 1 10E3/UL (ref 0.8–5.3)
LYMPHOCYTES # BLD AUTO: 1.4 10E3/UL (ref 0.8–5.3)
LYMPHOCYTES NFR BLD AUTO: 26 %
LYMPHOCYTES NFR BLD AUTO: 30 %
MAGNESIUM SERPL-MCNC: 1.9 MG/DL (ref 1.7–2.3)
MCH RBC QN AUTO: 31.7 PG (ref 26.5–33)
MCH RBC QN AUTO: 31.7 PG (ref 26.5–33)
MCHC RBC AUTO-ENTMCNC: 32.3 G/DL (ref 31.5–36.5)
MCHC RBC AUTO-ENTMCNC: 32.6 G/DL (ref 31.5–36.5)
MCV RBC AUTO: 97 FL (ref 78–100)
MCV RBC AUTO: 98 FL (ref 78–100)
MONOCYTES # BLD AUTO: 0.3 10E3/UL (ref 0–1.3)
MONOCYTES # BLD AUTO: 0.4 10E3/UL (ref 0–1.3)
MONOCYTES NFR BLD AUTO: 8 %
MONOCYTES NFR BLD AUTO: 8 %
NEUTROPHILS # BLD AUTO: 2.1 10E3/UL (ref 1.6–8.3)
NEUTROPHILS # BLD AUTO: 3.5 10E3/UL (ref 1.6–8.3)
NEUTROPHILS NFR BLD AUTO: 60 %
NEUTROPHILS NFR BLD AUTO: 65 %
NRBC # BLD AUTO: 0 10E3/UL
NRBC # BLD AUTO: 0 10E3/UL
NRBC BLD AUTO-RTO: 0 /100
NRBC BLD AUTO-RTO: 0 /100
PLATELET # BLD AUTO: 120 10E3/UL (ref 150–450)
PLATELET # BLD AUTO: 140 10E3/UL (ref 150–450)
POTASSIUM SERPL-SCNC: 3.9 MMOL/L (ref 3.4–5.3)
RBC # BLD AUTO: 3.19 10E6/UL (ref 3.8–5.2)
RBC # BLD AUTO: 3.98 10E6/UL (ref 3.8–5.2)
RSV RNA SPEC NAA+PROBE: NEGATIVE
SARS-COV-2 RNA RESP QL NAA+PROBE: NEGATIVE
SODIUM SERPL-SCNC: 141 MMOL/L (ref 135–145)
TROPONIN T SERPL HS-MCNC: 53 NG/L
UFH PPP CHRO-ACNC: 0.39 IU/ML
UFH PPP CHRO-ACNC: 0.43 IU/ML
VIT B12 SERPL-MCNC: 1050 PG/ML (ref 232–1245)
WBC # BLD AUTO: 3.5 10E3/UL (ref 4–11)
WBC # BLD AUTO: 5.3 10E3/UL (ref 4–11)

## 2024-02-26 PROCEDURE — 250N000013 HC RX MED GY IP 250 OP 250 PS 637: Performed by: HOSPITALIST

## 2024-02-26 PROCEDURE — 80048 BASIC METABOLIC PNL TOTAL CA: CPT | Performed by: HOSPITALIST

## 2024-02-26 PROCEDURE — 36415 COLL VENOUS BLD VENIPUNCTURE: CPT | Performed by: HOSPITALIST

## 2024-02-26 PROCEDURE — 36415 COLL VENOUS BLD VENIPUNCTURE: CPT | Performed by: INTERNAL MEDICINE

## 2024-02-26 PROCEDURE — 99222 1ST HOSP IP/OBS MODERATE 55: CPT | Performed by: INTERNAL MEDICINE

## 2024-02-26 PROCEDURE — 250N000011 HC RX IP 250 OP 636: Performed by: HOSPITALIST

## 2024-02-26 PROCEDURE — 250N000013 HC RX MED GY IP 250 OP 250 PS 637: Performed by: INTERNAL MEDICINE

## 2024-02-26 PROCEDURE — 85520 HEPARIN ASSAY: CPT | Performed by: HOSPITALIST

## 2024-02-26 PROCEDURE — 87637 SARSCOV2&INF A&B&RSV AMP PRB: CPT | Performed by: HOSPITALIST

## 2024-02-26 PROCEDURE — 84484 ASSAY OF TROPONIN QUANT: CPT | Performed by: HOSPITALIST

## 2024-02-26 PROCEDURE — 99223 1ST HOSP IP/OBS HIGH 75: CPT | Performed by: HOSPITALIST

## 2024-02-26 PROCEDURE — 85520 HEPARIN ASSAY: CPT | Performed by: INTERNAL MEDICINE

## 2024-02-26 PROCEDURE — 99222 1ST HOSP IP/OBS MODERATE 55: CPT | Mod: 25 | Performed by: INTERNAL MEDICINE

## 2024-02-26 PROCEDURE — 210N000001 HC R&B IMCU HEART CARE

## 2024-02-26 PROCEDURE — 85025 COMPLETE CBC W/AUTO DIFF WBC: CPT | Performed by: HOSPITALIST

## 2024-02-26 PROCEDURE — 83735 ASSAY OF MAGNESIUM: CPT | Performed by: HOSPITALIST

## 2024-02-26 PROCEDURE — 99207 PR NO BILLABLE SERVICE THIS VISIT: CPT | Performed by: INTERNAL MEDICINE

## 2024-02-26 RX ORDER — AMIODARONE HYDROCHLORIDE 200 MG/1
200 TABLET ORAL 2 TIMES DAILY
Status: DISCONTINUED | OUTPATIENT
Start: 2024-02-26 | End: 2024-02-27 | Stop reason: HOSPADM

## 2024-02-26 RX ORDER — ACETAMINOPHEN 325 MG/1
650 TABLET ORAL EVERY 4 HOURS PRN
Status: DISCONTINUED | OUTPATIENT
Start: 2024-02-26 | End: 2024-02-27 | Stop reason: HOSPADM

## 2024-02-26 RX ORDER — ACETAMINOPHEN 650 MG/1
650 SUPPOSITORY RECTAL EVERY 4 HOURS PRN
Status: DISCONTINUED | OUTPATIENT
Start: 2024-02-26 | End: 2024-02-27 | Stop reason: HOSPADM

## 2024-02-26 RX ORDER — GUAIFENESIN/DEXTROMETHORPHAN 100-10MG/5
10 SYRUP ORAL EVERY 4 HOURS PRN
Status: DISCONTINUED | OUTPATIENT
Start: 2024-02-26 | End: 2024-02-27 | Stop reason: HOSPADM

## 2024-02-26 RX ORDER — HEPARIN SODIUM 10000 [USP'U]/100ML
0-5000 INJECTION, SOLUTION INTRAVENOUS CONTINUOUS
Status: DISPENSED | OUTPATIENT
Start: 2024-02-26 | End: 2024-02-26

## 2024-02-26 RX ORDER — POLYETHYLENE GLYCOL 3350 17 G/17G
17 POWDER, FOR SOLUTION ORAL DAILY PRN
Status: DISCONTINUED | OUTPATIENT
Start: 2024-02-26 | End: 2024-02-27 | Stop reason: HOSPADM

## 2024-02-26 RX ORDER — AMOXICILLIN 250 MG
2 CAPSULE ORAL 2 TIMES DAILY PRN
Status: DISCONTINUED | OUTPATIENT
Start: 2024-02-26 | End: 2024-02-27 | Stop reason: HOSPADM

## 2024-02-26 RX ORDER — ONDANSETRON 4 MG/1
4 TABLET, ORALLY DISINTEGRATING ORAL EVERY 6 HOURS PRN
Status: DISCONTINUED | OUTPATIENT
Start: 2024-02-26 | End: 2024-02-27 | Stop reason: HOSPADM

## 2024-02-26 RX ORDER — ONDANSETRON 2 MG/ML
4 INJECTION INTRAMUSCULAR; INTRAVENOUS EVERY 6 HOURS PRN
Status: DISCONTINUED | OUTPATIENT
Start: 2024-02-26 | End: 2024-02-27 | Stop reason: HOSPADM

## 2024-02-26 RX ORDER — AMOXICILLIN 250 MG
1 CAPSULE ORAL 2 TIMES DAILY PRN
Status: DISCONTINUED | OUTPATIENT
Start: 2024-02-26 | End: 2024-02-27 | Stop reason: HOSPADM

## 2024-02-26 RX ORDER — HEPARIN SODIUM 10000 [USP'U]/100ML
0-5000 INJECTION, SOLUTION INTRAVENOUS CONTINUOUS
Status: DISCONTINUED | OUTPATIENT
Start: 2024-02-26 | End: 2024-02-26

## 2024-02-26 RX ORDER — LIDOCAINE 40 MG/G
CREAM TOPICAL
Status: DISCONTINUED | OUTPATIENT
Start: 2024-02-26 | End: 2024-02-26

## 2024-02-26 RX ORDER — LANOLIN ALCOHOL/MO/W.PET/CERES
3 CREAM (GRAM) TOPICAL
Status: DISCONTINUED | OUTPATIENT
Start: 2024-02-26 | End: 2024-02-27 | Stop reason: HOSPADM

## 2024-02-26 RX ORDER — POLYETHYLENE GLYCOL 3350 17 G/17G
1 POWDER, FOR SOLUTION ORAL DAILY PRN
COMMUNITY

## 2024-02-26 RX ORDER — ROSUVASTATIN CALCIUM 5 MG/1
5 TABLET, COATED ORAL AT BEDTIME
Status: DISCONTINUED | OUTPATIENT
Start: 2024-02-26 | End: 2024-02-27 | Stop reason: HOSPADM

## 2024-02-26 RX ORDER — CALCIUM CARBONATE 500 MG/1
1000 TABLET, CHEWABLE ORAL 4 TIMES DAILY PRN
Status: DISCONTINUED | OUTPATIENT
Start: 2024-02-26 | End: 2024-02-27 | Stop reason: HOSPADM

## 2024-02-26 RX ORDER — LIDOCAINE 40 MG/G
CREAM TOPICAL
Status: DISCONTINUED | OUTPATIENT
Start: 2024-02-26 | End: 2024-02-27 | Stop reason: HOSPADM

## 2024-02-26 RX ADMIN — ACETAMINOPHEN 650 MG: 325 TABLET, FILM COATED ORAL at 09:17

## 2024-02-26 RX ADMIN — APIXABAN 5 MG: 5 TABLET, FILM COATED ORAL at 21:11

## 2024-02-26 RX ADMIN — ROSUVASTATIN CALCIUM 5 MG: 5 TABLET, FILM COATED ORAL at 21:11

## 2024-02-26 RX ADMIN — AMIODARONE HYDROCHLORIDE 200 MG: 200 TABLET ORAL at 21:11

## 2024-02-26 RX ADMIN — HEPARIN SODIUM 700 UNITS/HR: 10000 INJECTION, SOLUTION INTRAVENOUS at 01:27

## 2024-02-26 RX ADMIN — AMIODARONE HYDROCHLORIDE 200 MG: 200 TABLET ORAL at 13:21

## 2024-02-26 ASSESSMENT — ACTIVITIES OF DAILY LIVING (ADL)
ADLS_ACUITY_SCORE: 35
ADLS_ACUITY_SCORE: 27
ADLS_ACUITY_SCORE: 35
ADLS_ACUITY_SCORE: 27
ADLS_ACUITY_SCORE: 24
ADLS_ACUITY_SCORE: 35
ADLS_ACUITY_SCORE: 27
ADLS_ACUITY_SCORE: 35
ADLS_ACUITY_SCORE: 27
ADLS_ACUITY_SCORE: 35
ADLS_ACUITY_SCORE: 35
ADLS_ACUITY_SCORE: 27
ADLS_ACUITY_SCORE: 35
ADLS_ACUITY_SCORE: 35
ADLS_ACUITY_SCORE: 27
ADLS_ACUITY_SCORE: 24
ADLS_ACUITY_SCORE: 35
ADLS_ACUITY_SCORE: 27
ADLS_ACUITY_SCORE: 35

## 2024-02-26 NOTE — CONSULTS
Patient has Medicare Advantage through [employer]    Eliquis/Xarelto  --$47/mo.  --lf/when total drug costs exceed $5,030, price will increase to a 25% coinsurance, equivalent to $142/mo.    Tammy Edwards  Pharmacy Technician/Liaison, Discharge Pharmacy   [phone] (voice or text)  [email or web address]

## 2024-02-26 NOTE — H&P
[facility]    History and Physical  Hospitalist       Date of Admission:  2/25/2024  Date of Service (when I saw the patient): 02/26/24    ASSESSMENT  Kate Lima is a markedly pleasant 83 year old woman non-smoker with past medical history that is most notable for hypertension and hyperlipidemia, among others; who presents with palpitations and is found to have new onset atrial fibrillation with sinus node arrest causing convulsive syncope.    PLAN     New onset atrial fibrillation with sinus node arrest and elevated Troponin: Of note, Ms. Lima is followed through [facility] for hypertension and hyperlipidemia. In 2017, her coronary calcium score was reportedly zero. Most recent TTE 10/2022 showed preserved LVEF, mild LAE, aortic sclerosis without stenosis, mild mitral regurgitation, mild to moderate TR, and borderline pulmonary hypertension. She is on Crestor and ARB (no AV lucian blockers reportedly).    Now, she presents for acute palpitations. In the ED, initial EKG showed SVT with rate near 150. While in the ED, she became hypotensive and acutely ill with narrow complex tachycardia. Flutter waves noted on the monitor. She underwent emergent cardioversion at 150 J, which produced prolonged sinus arrest for 20 seconds. She developed generalized convulsions. Levophed and Amiodarone and Phenylephrine boluses and Amiodarone infusion were started and she was given Ativan, which resolved the convulsions. She recovered consciousness. She remained initially in tachy-arrhythmia. With further Amiodarone however her heart converted to NSR and she is now fully awake and alert with sinus bradycardia, and now denies any acute complaints. WBC is normal. TSH is normal. Initial Troponin T is minimally elevated. CTPA and CTH show no acute pathology.    Overall, her symptoms seem most consistent with new onset acute atrial fibrillation/flutter, with sinus node arrest after cardioversion;  underlying sick sinus syndrome could be possible. Other narrow complex arrhythmia could be possible. Recent onset of rhinorrhea suggests a possible viral trigger. CT is negative for PE. Elevated Troponin seems most likely due to demand ischemia from acute illness. We will rule out ACS.       -- CCU. IMC status. NPO. Heparin infusion started. Telemetry, serial enzymes, TTE. Cardiology consulted.     -- Plans overnight to resume Amiodarone if arrhythmia recurs    -- Check COVID, Influenza, and RSV tests as well as Mag level    -- Repeat CBC and BMP in AM. SW consulted for disposition planning.    Convulsive syncope: Suspect due to sinus node arrest. She has no focal neurologic deficits. If syncope recurs would consider MRI and EEG.    Hypertension: Resume home Diovan when verified  Hyperlipidemia: Resume home Crestor when verified    Extensive past orthopedic history: She has had bilateral TKA, left hip bursitis, right hand cyst removal, carpal tunnel surgeries, and multiple prior spine procedures for spinal stenosis, scoliosis, and osteoarthritis. She also has chronic left shoulder bursitis. Noted    Acute on chronic anemia: Baseline HGB 11-13. Now 10. No signs of bleeding. Macrocytic. History of B12 deficiency noted. B12 level was normal in 9/2023.    -- B12 level repeated; monitor CBC while hospitalized and anti-coagulated    Recent Labs   Lab Test 02/25/24  2354   HGB 10.1*     History of Raynaud's Syndrome: Noted    I have spent 80 minutes on the date of service doing chart review, history, examination, documentation, and further activities per the note.    Chief Complaint   Palpitations    History is obtained from the patient and the ED physician whom I have spoken with    History of Present Illness   Kate Lima is a markedly pleasant 83 year old woman who presents with palpitations. She first noticed onset of these 5 days ago; she was doing light work around the house when she felt sudden onset of pounding  heart beat, associated with shaking and tremulousness. She drank some water and laid down, and after 45 minutes the symptoms resolved spontaneously. They have not recurred since, until this evening around dinner time while at rest she suddenly felt light headed, and then the pounding irregular heart rate recurred. She came in for further evaluation. She adds that for the past several days, she has had rhinorrhea. 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 She has a chronic abdominal hernia and is contemplating surgery for that before her 59th wedding anniversary celebrations this August. She had one glass of wine with dinner last night, none today. She otherwise denies any associated dyspnea, or recent fever, chills, sweats, cough orthopnea, leg swelling, nausea, or any other acute complaints.    In the ED,   02/25 1835 145/107  98.8 156 18 98 %     While in the ED, she became hypotensive and acutely ill with narrow complex tachycardia. She was noted to have flutter waves on the monitor. She underwent emergent cardioversion at 150 J, which produced prolonged sinus arrest for 20 seconds. She developed generalized convulsions. Levophed and Amiodarone and Phenylephrine boluses and Amiodarone infusion were started and she was given Ativan which resolved the convulsions. She recovered consciousness. She remained initially in tachy-arrhythmia. With further Amiodarone however her heart converted to NSR and she now denies any acute complaints.    CBC and BMP were notable for HGB 10.1, MCV 97, otherwise were within the normal reference range. WBC was 5.3. Troponin T was 16. Initial EKG tests showed supraventricular tachycardia; most recent shows sinus arrhythmia with PVC's. TSH was 2.09.     The case was discussed with Cardiology in the ED.    Recent Results (from the past 24 hour(s))   Head CT w/o contrast    Narrative    EXAM: CT HEAD W/O CONTRAST  LOCATION: [facility]  [facility]OSPITAL  DATE: 2/25/2024    INDICATION: seizure  COMPARISON: None.  TECHNIQUE: Routine CT Head without IV contrast. Multiplanar reformats. Dose reduction techniques were used.    FINDINGS:  INTRACRANIAL CONTENTS: No intracranial hemorrhage, extraaxial collection, or mass effect.  No CT evidence of acute infarct. Mild presumed chronic small vessel ischemic changes. Mild to moderate generalized volume loss. No hydrocephalus.     VISUALIZED ORBITS/SINUSES/MASTOIDS: Prior bilateral cataract surgery. Visualized portions of the orbits are otherwise unremarkable. No paranasal sinus mucosal disease. No middle ear or mastoid effusion.    BONES/SOFT TISSUES: No acute abnormality.      Impression    IMPRESSION:  1.  No CT evidence for acute intracranial process.  2.  Brain atrophy and presumed chronic microvascular ischemic changes as above.   CT Chest Pulmonary Embolism w Contrast    Narrative    EXAM: CT CHEST PULMONARY EMBOLISM W CONTRAST  LOCATION: [facility]  DATE: 2/25/2024    INDICATION: Tachycardia. Evaluate for PE.  COMPARISON: None.  TECHNIQUE: CT chest pulmonary angiogram during arterial phase injection of IV contrast. Multiplanar reformats and MIP reconstructions were performed. Dose reduction techniques were used.   CONTRAST: 56mL Isovue 370    FINDINGS:  ANGIOGRAM CHEST: Pulmonary arteries are normal caliber and negative for pulmonary emboli. Thoracic aorta is negative for dissection.    LUNGS AND PLEURA: Mild bandlike scarring or atelectasis in the lower lungs. Mild reticular scarring in the lung apices. No focal consolidation or focal ground glass opacity. No pleural effusion or pneumothorax.    MEDIASTINUM/AXILLAE: No adenopathy. No pericardial effusion.    CORONARY ARTERY CALCIFICATION: Mild.    UPPER ABDOMEN: Normal.    MUSCULOSKELETAL: Mild scattered degenerative changes in the spine. Postoperative changes lumbar spine with hardware.      Impression    IMPRESSION:  No acute  "findings in the chest. Negative for pulmonary embolism. No evidence of pneumonia.       PHYSICAL EXAM  Blood pressure 114/67, pulse 58, temperature 98.8  F (37.1  C), temperature source Oral, resp. rate (!) 9, height 1.626 m (5' 4\"), weight 56.7 kg (125 lb), SpO2 100%, not currently breastfeeding.  Constitutional: Alert and oriented to person, place and time; no apparent distress  Respiratory: lungs clear to auscultation bilaterally  Cardiovascular: regular S1 S2  GI: abdomen soft non tender non distended bowel sounds positive  Musculoskeletal: no clubbing, cyanosis or edema  Neurologic: extra-ocular muscles intact; moves all four extremities  Psychiatric: appropriate affect, insight and judgment     DVT Prophylaxis: Heparin IV  Code Status: Full Code    Disposition: Expected discharge in 2-4 days    Davon Gomez MD, MD    Past Medical History    I have reviewed this patient's medical history and updated it with pertinent information if needed.   Past Medical History:   Diagnosis Date    Atrophic vaginitis     B12 deficiency     Calcific tendinitis of left shoulder     Glaucoma     Horseshoe kidney     HTN (hypertension)     Hyperlipidemia     Leukocytopenia     Lichen planus     Nutcracker esophagus     Osteopenia     With osteoarthritis    Raynaud's syndrome     Reportedly MEHNAZ and SCL-70 tests were negative in 2006    Scoliosis     Spinal stenosis     Trochanteric bursitis of left hip        Past Surgical History   I have reviewed this patient's surgical history and updated it with pertinent information if needed.  Past Surgical History:   Procedure Laterality Date    CATARACT EXTRACTION      ORTHOPEDIC SURGERY  2009    carpal tunnel surgery + new thumb joints    REPLACEMENT TOTAL KNEE Right 2020    right and left thumb joints      SPINE SURGERY  10/25/2018    3 fusions in back    TOTAL KNEE ARTHROPLASTY Left 07/07/2021     VENOUS POST ABLATION LEGS BILATERAL  2017       Prior to Admission Medications "   Prior to Admission Medications   Prescriptions Last Dose Informant Patient Reported? Taking?   Acetaminophen 325 MG CAPS   Yes No   Sig: Take 325-650 mg by mouth every 4 hours as needed   Patient not taking: Reported on 4/18/2022   Calcium Carbonate-Vitamin D (CALCIUM 600+D PO)   Yes No   Sig: Take  by mouth daily.   MUPIROCIN EX   Yes No   Sig: Externally apply  topically.   Patient not taking: Reported on 4/18/2022   acetaminophen (TYLENOL) 500 MG tablet   Yes No   Sig: Patient states she takes 6 tabs daily   alendronate (FOSAMAX) 70 MG tablet   Yes No   Sig: Take 70 mg by mouth   amoxicillin (AMOXIL) 500 MG tablet   Yes No   Sig: TAKE 4 TABLETS BY MOUTH 1 HOUR BEFORE PROCEDURE   cholecalciferol (VITAMIN  -D) 1000 UNITS capsule   Yes No   Sig: Take 1 capsule by mouth daily.   ciclopirox (PENLAC) 8 % external solution   Yes No   Sig: Apply topically at bedtime   fiber modular (NUTRISOURCE FIBER) packet   Yes No   Sig: 3 times daily (with meals)   Patient not taking: Reported on 4/18/2022   fluocinonide (LIDEX) 0.05 % ointment   Yes No   Patient not taking: Reported on 4/18/2022   fluocinonide (LIDEX) 0.05 % solution   Yes No   Sig: apply to ears PRN per derm   Patient not taking: Reported on 4/18/2022   hydrocortisone (WESTCORT) 0.2 % cream   Yes No   Sig: Apply  topically 2 times daily.   Patient not taking: Reported on 4/18/2022   pimecrolimus (ELIDEL) 1 % external cream   Yes No   Sig: Apply  topically 2 times daily.   Patient not taking: Reported on 4/18/2022   rosuvastatin (CRESTOR) 5 MG tablet   Yes Yes   Sig: Take 5 mg by mouth daily   teriparatide, recombinant, (FORTEO) 600 MCG/2.4ML SOLN injection   Yes No   Sig: Inject 20 mcg Subcutaneous   Patient not taking: Reported on 4/18/2022   valsartan (DIOVAN) 160 MG tablet   Yes Yes   Sig: Take 160 mg by mouth daily      Facility-Administered Medications: None     Allergies   Allergies   Allergen Reactions    Bacitracin     Bacitracin-Polymyxin B      Other  reaction(s): Erythema    Liquid Adhesive Rash     Uses sensitive bandaids       Social History   I have reviewed this patient's social history and updated it with pertinent information if needed. Kate Lima  reports that she has never smoked. She has never used smokeless tobacco. She reports current alcohol use. She reports that she does not use drugs.    Family History   Family history assessed and, except as above, is non-contributory.    Family History   Problem Relation Age of Onset    Hypertension Mother     Depression Mother     Breast Cancer Mother     Cancer Father     Lung Cancer Father     Cancer Brother     Colon Cancer Other     Lung Cancer Brother     No Known Problems Sister     No Known Problems Maternal Grandfather     No Known Problems Paternal Grandmother        Review of Systems   The 10 point Review of Systems is negative other than noted in the HPI or here.     Primary Care Physician   Tim Matute    Data   Labs Ordered and Resulted from Time of ED Arrival to Time of ED Departure   TROPONIN T, HIGH SENSITIVITY - Abnormal       Result Value    Troponin T, High Sensitivity 16 (*)    CBC WITH PLATELETS AND DIFFERENTIAL - Abnormal    WBC Count 5.3      RBC Count 3.19 (*)     Hemoglobin 10.1 (*)     Hematocrit 31.0 (*)     MCV 97      MCH 31.7      MCHC 32.6      RDW 13.2      Platelet Count 120 (*)     % Neutrophils 65      % Lymphocytes 26      % Monocytes 8      % Eosinophils 1      % Basophils 0      % Immature Granulocytes 0      NRBCs per 100 WBC 0      Absolute Neutrophils 3.5      Absolute Lymphocytes 1.4      Absolute Monocytes 0.4      Absolute Eosinophils 0.0      Absolute Basophils 0.0      Absolute Immature Granulocytes 0.0      Absolute NRBCs 0.0     BASIC METABOLIC PANEL - Normal    Sodium 139      Potassium 4.3      Chloride 102      Carbon Dioxide (CO2) 24      Anion Gap 13      Urea Nitrogen 21.1      Creatinine 0.71      GFR Estimate 83      Calcium 9.8      Glucose 97      TSH WITH FREE T4 REFLEX - Normal    TSH 2.09     MAGNESIUM - Normal    Magnesium 2.0         Data reviewed today:  I personally reviewed the EKG tracing showing SVT, the chest CT image(s) showing no acute pathology, and the head CT image(s) showing no acute pathology .

## 2024-02-26 NOTE — CONSULTS
[facility]    Cardiology Consultation     Date of Admission:  2/25/2024    Assessment & Plan     1.  Probable atrial flutter status post cardioversion  2.  Sinus pauses of 20 seconds with seizure-like activity after cardioversion  3.  Present normal sinus rhythm on amiodarone  4.  Possible sick sinus syndrome  5.  History of valvular heart disease by outside echocardiogram, mild MR and mild to moderate TR with preserved LV systolic function by study 2022  6.  Osteoarthritis status post extensive orthopedic surgeries  7.  Non-STEMI likely type II demand MI  8.  Hyperlipidemia  9.  Hypertension on angiotensin receptor blocker outpatient  10.  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  Atrial flutter with cardioversion and post cardioversion with 20 pause.  Following this patient had convulsive syncope which resolved.  Was placed on pressor support initially is now back to normal sinus rhythm.  Will consult electrophysiology to see if other measures are needed like pacemaker or ablative therapy.  Unfortunately the strips are not here to review during the time of her pause.  Outpatient she was not on any AV node agents.    2.  At this time continue to maintain n.p.o. until seen by electrophysiology.    3.  General cardiology will be in the background if need be, echocardiogram is pending at time of my visit.        Brábara Ravi MD, MD        HPI:      Patient is an 83-year-old woman who typically follows with [facility].  She was last seen by them in 2022.  She has had a calcium score which was 0 in 2017 and also was followed by them for hyperlipidemia and hypertension.  For the last several days she has been noticing palpitations.  She had an episode 4 days preceding her presentation where she felt that her heart was racing she sat down and she almost passed out.  She states that her symptoms abated on their own.  She had another episode  leading to her current presentation.  She was found to have narrow complex tachycardia suggestive of atrial flutter.  She became hypotensive and a code was called.  As a result she underwent cardioversion x 1 with 150 J.  She had a prolonged sinus arrest for 20 seconds at that time.  She unfortunately had seizures which required medical therapy and she had resuscitation with amiodarone and phenylephrine bonuses.  She regained consciousness.  With amiodarone she converted back to normal sinus rhythm.  Cardiology services consulted.  Opponent is minimally elevated at 16.  She is not complaining of any chest pain.            Primary Care Physician   Tim Matute    Patient Active Problem List   Diagnosis    Arthritis    Dysphonia    Cardiogenic shock (H)    Seizure (H)    Atrial flutter with rapid ventricular response (H)       Past Medical History   I have reviewed this patient's medical history and updated it with pertinent information if needed.   Past Medical History:   Diagnosis Date    Atrophic vaginitis     B12 deficiency     Calcific tendinitis of left shoulder     Glaucoma     Horseshoe kidney     HTN (hypertension)     Hyperlipidemia     Leukocytopenia     Lichen planus     Nutcracker esophagus     Osteopenia     With osteoarthritis    Raynaud's syndrome     Reportedly MEHNAZ and SCL-70 tests were negative in 2006    Scoliosis     Spinal stenosis     Trochanteric bursitis of left hip        Past Surgical History   I have reviewed this patient's surgical history and updated it with pertinent information if needed.  Past Surgical History:   Procedure Laterality Date    CATARACT EXTRACTION      ORTHOPEDIC SURGERY  2009    carpal tunnel surgery + new thumb joints    REPLACEMENT TOTAL KNEE Right 2020    right and left thumb joints      SPINE SURGERY  10/25/2018    3 fusions in back    TOTAL KNEE ARTHROPLASTY Left 07/07/2021     VENOUS POST ABLATION LEGS BILATERAL  2017       Prior to Admission Medications    Prior to Admission Medications   Prescriptions Last Dose Informant Patient Reported? Taking?   Acetaminophen 325 MG CAPS   Yes No   Sig: Take 325-650 mg by mouth every 4 hours as needed   Patient not taking: Reported on 4/18/2022   Calcium Carbonate-Vitamin D (CALCIUM 600+D PO)   Yes No   Sig: Take  by mouth daily.   MUPIROCIN EX   Yes No   Sig: Externally apply  topically.   Patient not taking: Reported on 4/18/2022   acetaminophen (TYLENOL) 500 MG tablet   Yes No   Sig: Patient states she takes 6 tabs daily   alendronate (FOSAMAX) 70 MG tablet   Yes No   Sig: Take 70 mg by mouth   amoxicillin (AMOXIL) 500 MG tablet   Yes No   Sig: TAKE 4 TABLETS BY MOUTH 1 HOUR BEFORE PROCEDURE   cholecalciferol (VITAMIN  -D) 1000 UNITS capsule   Yes No   Sig: Take 1 capsule by mouth daily.   ciclopirox (PENLAC) 8 % external solution   Yes No   Sig: Apply topically at bedtime   fiber modular (NUTRISOURCE FIBER) packet   Yes No   Sig: 3 times daily (with meals)   Patient not taking: Reported on 4/18/2022   fluocinonide (LIDEX) 0.05 % ointment   Yes No   Patient not taking: Reported on 4/18/2022   fluocinonide (LIDEX) 0.05 % solution   Yes No   Sig: apply to ears PRN per derm   Patient not taking: Reported on 4/18/2022   hydrocortisone (WESTCORT) 0.2 % cream   Yes No   Sig: Apply  topically 2 times daily.   Patient not taking: Reported on 4/18/2022   pimecrolimus (ELIDEL) 1 % external cream   Yes No   Sig: Apply  topically 2 times daily.   Patient not taking: Reported on 4/18/2022   rosuvastatin (CRESTOR) 5 MG tablet   Yes Yes   Sig: Take 5 mg by mouth daily   teriparatide, recombinant, (FORTEO) 600 MCG/2.4ML SOLN injection   Yes No   Sig: Inject 20 mcg Subcutaneous   Patient not taking: Reported on 4/18/2022   valsartan (DIOVAN) 160 MG tablet   Yes Yes   Sig: Take 160 mg by mouth daily      Facility-Administered Medications: None     Current Facility-Administered Medications   Medication Dose Route Frequency    sodium chloride  "(PF)  3 mL Intracatheter Q8H     Current Facility-Administered Medications   Medication Last Rate    heparin 700 Units/hr (02/26/24 0328)    Reason anticoagulant not prescribed for atrial fibrillation       Allergies   Allergies   Allergen Reactions    Bacitracin     Bacitracin-Polymyxin B      Other reaction(s): Erythema    Liquid Adhesive Rash     Uses sensitive bandaids       Social History    reports that she has never smoked. She has never used smokeless tobacco. She reports current alcohol use. She reports that she does not use drugs.    Family History   Family History   Problem Relation Age of Onset    Hypertension Mother     Depression Mother     Breast Cancer Mother     Cancer Father     Lung Cancer Father     Cancer Brother     Colon Cancer Other     Lung Cancer Brother     No Known Problems Sister     No Known Problems Maternal Grandfather     No Known Problems Paternal Grandmother        Review of Systems   The comprehensive 10 point Review of Systems is negative other than noted in the HPI or here.     Physical Exam   Vital Signs with Ranges  Temp:  [98.8  F (37.1  C)] 98.8  F (37.1  C)  Pulse:  [] 58  Resp:  [0-58] 11  BP: ()/() 143/66  SpO2:  [69 %-100 %] 100 %  Wt Readings from Last 4 Encounters:   02/25/24 56.7 kg (125 lb)   04/18/22 59.4 kg (131 lb)   10/08/20 59.4 kg (131 lb)   08/02/19 60.3 kg (133 lb)     I/O last 3 completed shifts:  In: 1100 [I.V.:100; IV Piggyback:1000]  Out: -       Vitals: BP (!) 143/66   Pulse 58   Temp 98.8  F (37.1  C) (Oral)   Resp 11   Ht 1.626 m (5' 4\")   Wt 56.7 kg (125 lb)   SpO2 100%   BMI 21.46 kg/m      Constitutional: Alert and oriented to person, place and time; no apparent distress  Respiratory: lungs clear to auscultation bilaterally  Cardiovascular: regular S1 S2  GI: abdomen soft non tender non distended bowel sounds positive  Musculoskeletal: no clubbing, cyanosis or edema  Neurologic: extra-ocular muscles intact; moves all four " "extremities  Psychiatric: appropriate affect, insight and judgment  No lab results found in last 7 days.    Invalid input(s): \"TROPONINIES\"    Recent Labs   Lab 02/26/24 0739 02/25/24 2354 02/25/24 1848   WBC 3.5* 5.3  --    HGB 12.6 10.1*  --    MCV 98 97  --    * 120*  --    NA  --   --  139   POTASSIUM  --   --  4.3   CHLORIDE  --   --  102   CO2  --   --  24   BUN  --   --  21.1   CR  --   --  0.71   GFRESTIMATED  --   --  83   ANIONGAP  --   --  13   TUNDE  --   --  9.8   GLC  --   --  97     No results for input(s): \"CHOL\", \"HDL\", \"LDL\", \"TRIG\", \"CHOLHDLRATIO\" in the last 52850 hours.  Recent Labs   Lab 02/26/24 0739 02/25/24 2354 02/25/24 1848   WBC 3.5* 5.3  --    HGB 12.6 10.1*  --    HCT 39.0 31.0*  --    MCV 98 97  --    * 120*  --    B12  --   --  1,050     No results for input(s): \"PH\", \"PHV\", \"PO2\", \"PO2V\", \"SAT\", \"PCO2\", \"PCO2V\", \"HCO3\", \"HCO3V\" in the last 168 hours.  No results for input(s): \"NTBNPI\", \"NTBNP\" in the last 168 hours.  No results for input(s): \"DD\" in the last 168 hours.  No results for input(s): \"SED\", \"CRP\" in the last 168 hours.  Recent Labs   Lab 02/26/24 0739 02/25/24 2354   * 120*     Recent Labs   Lab 02/25/24  1848   TSH 2.09     No results for input(s): \"COLOR\", \"APPEARANCE\", \"URINEGLC\", \"URINEBILI\", \"URINEKETONE\", \"SG\", \"UBLD\", \"URINEPH\", \"PROTEIN\", \"UROBILINOGEN\", \"NITRITE\", \"LEUKEST\", \"RBCU\", \"WBCU\" in the last 168 hours.    Imaging:  Recent Results (from the past 48 hour(s))   -Cardioversion External    Narrative    Xavier Ontiveros MD     2/26/2024  2:42 AM  [facility]    -Cardioversion External    Date/Time: 2/25/2024 7:35 PM    Performed by: Xavier Ontiveros MD  Authorized by: Xavier Ontiveros MD    Emergent condition/consent implied    ED EVALUATION:      Assessment Time: 2/25/2024 7:30 PM      I have performed an Emergency Department Evaluation including taking a   history and physical examination, this evaluation " will be documented in   the electronic medical record for this ED encounter.     Indication: Unstable narrrow complex tachycardia    ASA Class: Class 4- Severe systemic disease, acute unstable problems    Mallampati: Grade 2- soft palate, base of uvula, tonsillar pillars, and   portion of posterior pharyngeal wall visible    NPO Status: not NPO, emergent situation    UNIVERSAL PROTOCOL   Site Marked: NA  Prior Images Obtained and Reviewed:  NA  Required items: Required blood products, implants, devices and special   equipment available    Patient identity confirmed:  Verbally with patient and arm band  Patient was reevaluated immediately before administering moderate or deep   sedation or anesthesia  Confirmation Checklist:  Relevant allergies, patient's identity using two   indicators, procedure was appropriate and matched the consent or emergent   situation and correct equipment/implants were available  Universal Protocol: the Joint Commission Universal Protocol was followed      SEDATION  Patient Sedated: Yes    Sedation Type:  Moderate (conscious) sedation  Sedation:  Etomidate  Vital signs: Vital signs monitored during sedation      PRE-PROCEDURE DETAILS:     Cardioversion basis:  Emergent    Rhythm:  Supraventricular tachycardia    Electrode placement:  Anterior-posterior  Attempt one:     Cardioversion mode:  Synchronous    Waveform:  Biphasic    Shock (Joules):  150    Cardioversion outcome attempt one: Prolonged sinus arrest following   shock lasting 15-20 seconds, followed by brief conversion to sinus rhythm   for 30 seconds before the rapid supraventricular tachycardia recurred.      PROCEDURE    Patient complications:  Other (see comment) (Due to prolonged sinus arrest   and brain hypoperfusion patient developed a generalized tonic clonic   seizure lasting 2 minutes before it was terminated using 2mg IV ativan.)  Length of time physician/provider present for 1:1 monitoring during   sedation: 30   Head CT  w/o contrast    Narrative    EXAM: CT HEAD W/O CONTRAST  LOCATION: [facility]  DATE: 2/25/2024    INDICATION: seizure  COMPARISON: None.  TECHNIQUE: Routine CT Head without IV contrast. Multiplanar reformats. Dose reduction techniques were used.    FINDINGS:  INTRACRANIAL CONTENTS: No intracranial hemorrhage, extraaxial collection, or mass effect.  No CT evidence of acute infarct. Mild presumed chronic small vessel ischemic changes. Mild to moderate generalized volume loss. No hydrocephalus.     VISUALIZED ORBITS/SINUSES/MASTOIDS: Prior bilateral cataract surgery. Visualized portions of the orbits are otherwise unremarkable. No paranasal sinus mucosal disease. No middle ear or mastoid effusion.    BONES/SOFT TISSUES: No acute abnormality.      Impression    IMPRESSION:  1.  No CT evidence for acute intracranial process.  2.  Brain atrophy and presumed chronic microvascular ischemic changes as above.   CT Chest Pulmonary Embolism w Contrast    Narrative    EXAM: CT CHEST PULMONARY EMBOLISM W CONTRAST  LOCATION: [facility]  DATE: 2/25/2024    INDICATION: Tachycardia. Evaluate for PE.  COMPARISON: None.  TECHNIQUE: CT chest pulmonary angiogram during arterial phase injection of IV contrast. Multiplanar reformats and MIP reconstructions were performed. Dose reduction techniques were used.   CONTRAST: 56mL Isovue 370    FINDINGS:  ANGIOGRAM CHEST: Pulmonary arteries are normal caliber and negative for pulmonary emboli. Thoracic aorta is negative for dissection.    LUNGS AND PLEURA: Mild bandlike scarring or atelectasis in the lower lungs. Mild reticular scarring in the lung apices. No focal consolidation or focal ground glass opacity. No pleural effusion or pneumothorax.    MEDIASTINUM/AXILLAE: No adenopathy. No pericardial effusion.    CORONARY ARTERY CALCIFICATION: Mild.    UPPER ABDOMEN: Normal.    MUSCULOSKELETAL: Mild scattered degenerative changes in the spine.  Postoperative changes lumbar spine with hardware.      Impression    IMPRESSION:  No acute findings in the chest. Negative for pulmonary embolism. No evidence of pneumonia.       Echo:  No results found for this or any previous visit (from the past 4320 hour(s)).

## 2024-02-26 NOTE — CONSULTS
[facility]    Electrophysiology Consultation     Date of Admission:  2/25/2024  Date of Consult: 02/26/24    Assessment & Plan   Kate Lima is a 83 year old female who was admitted on 2/25/2024. We were asked to see the patient for sinus arrest following cardioversion.  She presented in SVT vs Aflutter with rates in the 150's.  She had a prolonged sinus pause after cardioversion.  She had recurrent SVT and was given one dose of IV amiodarone and converted back to sinus rhythm.  She has been in sinus rhythm with normal rates overnight.   Her long sinus pause occurred after electrical cardioversion so I would not recommend pacemaker at this time.  I would recommend starting an anti-arrhythmic drug to prevent recurrent SVT/Aflutter.  Echocardiogram has been ordered.  We will continue monitoring her today and anticipate discharge with a Ziopatch monitor tomorrow.    - Start amiodarone 200 mg BID for 2 weeks, then 200 mg daily  - Will follow up on echo results  - Anticipate discharge tomorrow with 2 week Ziopatch monitor       Alvaro Cervantes MD        Code Status    Full Code    Reason for Consult   Reason for consult: Consult performed at the request of the attending physician, Ruben Girard MD, for evaluation of sinus arrest following cardioversion      Chief Complaint   Supraventricular tachycardia    History is obtained from the patient and EPIC chart.      History of Present Illness   Kate Lima is a 83 year old female with hypertension, hyperlipidemia, who presented to the ED on 2/25/2024 with palpitations.  Initial EKG showed a regular narrow complex tachycardia with rates around 150 bpm.  The patient was hypotensive with increasing dizziness so she underwent urgent cardioversion which resulted in a prolonged sinus pause lasting about 15 seconds.  She had brief sinus rhythm and then went back into SVT.  Immediately following this, the patient had generalized seizure activity and was  treated with IV ativan.  She continued to be hypotensive so was started on norepineprhine and was given a 150 mg IV bolus of amiodarone.  She converted to normal sinus rhythm shortly after and was taken of norepi.    The patient states she had an episode of palpitations 4-5 days ago.  She rested for a while and it went away.  On Sunday, she had the same symptoms while she was working around the house.  Since this was the second episode, she decided she should go in for evaluation.  By the time she got to the ED, she felt her symptoms had worsened, she felt more lightheaded.  She is now feeling well.  She reports no prior cardiac history.  Has never had anything like this before.  No history of syncope.    No recent illnesses, although she states she has a lot of problems with her joints and is being treated for lichen planus with a topical nail polish, and has a hernia.       Past Medical History   I have reviewed this patient's medical history and updated it with pertinent information if needed.   Past Medical History:   Diagnosis Date    Atrophic vaginitis     B12 deficiency     Calcific tendinitis of left shoulder     Glaucoma     Horseshoe kidney     HTN (hypertension)     Hyperlipidemia     Leukocytopenia     Lichen planus     Nutcracker esophagus     Osteopenia     With osteoarthritis    Raynaud's syndrome     Reportedly MEHNAZ and SCL-70 tests were negative in 2006    Scoliosis     Spinal stenosis     Trochanteric bursitis of left hip        Past Surgical History   I have reviewed this patient's surgical history and updated it with pertinent information if needed.  Past Surgical History:   Procedure Laterality Date    CATARACT EXTRACTION      ORTHOPEDIC SURGERY  2009    carpal tunnel surgery + new thumb joints    REPLACEMENT TOTAL KNEE Right 2020    right and left thumb joints      SPINE SURGERY  10/25/2018    3 fusions in back    TOTAL KNEE ARTHROPLASTY Left 07/07/2021     VENOUS POST ABLATION LEGS BILATERAL   2017       Prior to Admission Medications   Prior to Admission Medications   Prescriptions Last Dose Informant Patient Reported? Taking?   Acetaminophen 325 MG CAPS   Yes No   Sig: Take 325-650 mg by mouth every 4 hours as needed   Patient not taking: Reported on 4/18/2022   Calcium Carbonate-Vitamin D (CALCIUM 600+D PO)   Yes No   Sig: Take  by mouth daily.   MUPIROCIN EX   Yes No   Sig: Externally apply  topically.   Patient not taking: Reported on 4/18/2022   acetaminophen (TYLENOL) 500 MG tablet   Yes No   Sig: Patient states she takes 6 tabs daily   alendronate (FOSAMAX) 70 MG tablet   Yes No   Sig: Take 70 mg by mouth   amoxicillin (AMOXIL) 500 MG tablet   Yes No   Sig: TAKE 4 TABLETS BY MOUTH 1 HOUR BEFORE PROCEDURE   cholecalciferol (VITAMIN  -D) 1000 UNITS capsule   Yes No   Sig: Take 1 capsule by mouth daily.   ciclopirox (PENLAC) 8 % external solution   Yes No   Sig: Apply topically at bedtime   fiber modular (NUTRISOURCE FIBER) packet   Yes No   Sig: 3 times daily (with meals)   Patient not taking: Reported on 4/18/2022   fluocinonide (LIDEX) 0.05 % ointment   Yes No   Patient not taking: Reported on 4/18/2022   fluocinonide (LIDEX) 0.05 % solution   Yes No   Sig: apply to ears PRN per derm   Patient not taking: Reported on 4/18/2022   hydrocortisone (WESTCORT) 0.2 % cream   Yes No   Sig: Apply  topically 2 times daily.   Patient not taking: Reported on 4/18/2022   pimecrolimus (ELIDEL) 1 % external cream   Yes No   Sig: Apply  topically 2 times daily.   Patient not taking: Reported on 4/18/2022   rosuvastatin (CRESTOR) 5 MG tablet   Yes Yes   Sig: Take 5 mg by mouth daily   teriparatide, recombinant, (FORTEO) 600 MCG/2.4ML SOLN injection   Yes No   Sig: Inject 20 mcg Subcutaneous   Patient not taking: Reported on 4/18/2022   valsartan (DIOVAN) 160 MG tablet   Yes Yes   Sig: Take 160 mg by mouth daily      Facility-Administered Medications: None         Medications    heparin 700 Units/hr (02/26/24 0933)     Reason anticoagulant not prescribed for atrial fibrillation        sodium chloride (PF)  3 mL Intracatheter Q8H       Allergies   Allergies   Allergen Reactions    Bacitracin     Bacitracin-Polymyxin B      Other reaction(s): Erythema    Liquid Adhesive Rash     Uses sensitive bandaids       Social History   I have updated and reviewed the following Social History Narrative:   Social History     Social History Narrative    Not on file        Family History   I have reviewed this patient's family history and updated it with pertinent information if needed.   Family History   Problem Relation Age of Onset    Hypertension Mother     Depression Mother     Breast Cancer Mother     Cancer Father     Lung Cancer Father     Cancer Brother     Colon Cancer Other     Lung Cancer Brother     No Known Problems Sister     No Known Problems Maternal Grandfather     No Known Problems Paternal Grandmother        Review of Systems   A complete 10-point review of systems was done and is negative with the exceptions noted in HPI.      Physical Exam   Temp: 98.8  F (37.1  C) Temp src: Oral BP: (!) 143/66 Pulse: 58   Resp: 11 SpO2: 100 % O2 Device: None (Room air)    Vital Signs with Ranges  Temp:  [98.8  F (37.1  C)] 98.8  F (37.1  C)  Pulse:  [] 58  Resp:  [0-58] 11  BP: ()/() 143/66  SpO2:  [69 %-100 %] 100 %  125 lbs 0 oz    General: Pleasant, no acute distress  Resp: Breathing comfortably on RA  Card: Regular rate and rhythm   Extremities: No significant edema   Neuro: Awake, alert, answers questions appropriately       Diagnostics:  I personally reviewed the patient's EKG, lab work, and pertinent imaging    Recent Labs   Lab Test 02/26/24  0739 02/25/24  2354 02/25/24  1848   WBC 3.5* 5.3  --    HGB 12.6 10.1*  --    HCT 39.0 31.0*  --    MCV 98 97  --    * 120*  --    NA  --   --  139   CO2  --   --  24   BUN  --   --  21.1   CR  --   --  0.71       Last Comprehensive Metabolic Panel:  Lab Results    Component Value Date     02/25/2024    POTASSIUM 4.3 02/25/2024    CHLORIDE 102 02/25/2024    CO2 24 02/25/2024    ANIONGAP 13 02/25/2024    GLC 97 02/25/2024    BUN 21.1 02/25/2024    CR 0.71 02/25/2024    GFRESTIMATED 83 02/25/2024    TUNDE 9.8 02/25/2024         Telemetry:  sinus rhythm, rate 60's bpm    EKG on admission  2/25/2024 at 6:37 pm showed SVT, rate 152 bpm  EKG from 7:38 PM appears to be atypical flutter vs Atach  EKG from 2/25/2024 at 8:52 pm showed sinus rhythm, rate 66 bpm, normal intervals      Results for orders placed or performed during the hospital encounter of 02/25/24 (from the past 24 hour(s))   EKG 12-lead, tracing only   Result Value Ref Range    Systolic Blood Pressure  mmHg    Diastolic Blood Pressure  mmHg    Ventricular Rate 152 BPM    Atrial Rate  BPM    NY Interval  ms    QRS Duration 110 ms     ms    QTc 477 ms    P Axis  degrees    R AXIS 9 degrees    T Axis 67 degrees    Interpretation ECG       ** Critical Test Result: High HR  Supraventricular tachycardia  Right bundle branch block  Abnormal ECG  No previous ECGs available  Confirmed by GENERATED REPORT, COMPUTER ([id number]),  Diana Daniels ([id number]) on 2/25/2024 7:28:01 PM     [place] [facility]    Narrative    The following orders were created for panel order [place] Draw.  Procedure                               Abnormality         Status                     ---------                               -----------         ------                     Extra Blue Top Tube[[id number]]                              Final result               Extra Red Top Tube[[id number]]                               Final result               Extra Green Top (Lithium...[[id number]]                                                 Extra Purple Top Tube[[id number]]                                                         Please view results for these tests on the individual orders.   Extra Blue Top Tube   Result Value Ref Range    Hold Specimen  [facility]    Extra Red Top Tube   Result Value Ref Range    Hold Specimen [facility]    Basic metabolic panel   Result Value Ref Range    Sodium 139 135 - 145 mmol/L    Potassium 4.3 3.4 - 5.3 mmol/L    Chloride 102 98 - 107 mmol/L    Carbon Dioxide (CO2) 24 22 - 29 mmol/L    Anion Gap 13 7 - 15 mmol/L    Urea Nitrogen 21.1 8.0 - 23.0 mg/dL    Creatinine 0.71 0.51 - 0.95 mg/dL    GFR Estimate 83 >60 mL/min/1.73m2    Calcium 9.8 8.8 - 10.2 mg/dL    Glucose 97 70 - 99 mg/dL   TSH with free T4 reflex   Result Value Ref Range    TSH 2.09 0.30 - 4.20 uIU/mL   Magnesium   Result Value Ref Range    Magnesium 2.0 1.7 - 2.3 mg/dL   Troponin T, High Sensitivity   Result Value Ref Range    Troponin T, High Sensitivity 16 (H) <=14 ng/L   Vitamin B12   Result Value Ref Range    Vitamin B12 1,050 232 - 1,245 pg/mL   CBC with platelets differential *Canceled*    Narrative    The following orders were created for panel order CBC with platelets differential.  Procedure                               Abnormality         Status                     ---------                               -----------         ------                       Please view results for these tests on the individual orders.   CBC with platelets differential *Canceled*    Narrative    The following orders were created for panel order CBC with platelets differential.  Procedure                               Abnormality         Status                     ---------                               -----------         ------                     CBC with platelets and d...[[id number]]                                                   Please view results for these tests on the individual orders.   -Cardioversion External    Narrative    Xavier Ontiveros MD     2/26/2024  2:42 AM  [facility]    -Cardioversion External    Date/Time: 2/25/2024 7:35 PM    Performed by: Xavier Ontiveros MD  Authorized by: Xavier Ontiveros MD    Emergent condition/consent  implied    ED EVALUATION:      Assessment Time: 2/25/2024 7:30 PM      I have performed an Emergency Department Evaluation including taking a   history and physical examination, this evaluation will be documented in   the electronic medical record for this ED encounter.     Indication: Unstable narrrow complex tachycardia    ASA Class: Class 4- Severe systemic disease, acute unstable problems    Mallampati: Grade 2- soft palate, base of uvula, tonsillar pillars, and   portion of posterior pharyngeal wall visible    NPO Status: not NPO, emergent situation    UNIVERSAL PROTOCOL   Site Marked: NA  Prior Images Obtained and Reviewed:  NA  Required items: Required blood products, implants, devices and special   equipment available    Patient identity confirmed:  Verbally with patient and arm band  Patient was reevaluated immediately before administering moderate or deep   sedation or anesthesia  Confirmation Checklist:  Relevant allergies, patient's identity using two   indicators, procedure was appropriate and matched the consent or emergent   situation and correct equipment/implants were available  Universal Protocol: the Joint Commission Universal Protocol was followed      SEDATION  Patient Sedated: Yes    Sedation Type:  Moderate (conscious) sedation  Sedation:  Etomidate  Vital signs: Vital signs monitored during sedation      PRE-PROCEDURE DETAILS:     Cardioversion basis:  Emergent    Rhythm:  Supraventricular tachycardia    Electrode placement:  Anterior-posterior  Attempt one:     Cardioversion mode:  Synchronous    Waveform:  Biphasic    Shock (Joules):  150    Cardioversion outcome attempt one: Prolonged sinus arrest following   shock lasting 15-20 seconds, followed by brief conversion to sinus rhythm   for 30 seconds before the rapid supraventricular tachycardia recurred.      PROCEDURE    Patient complications:  Other (see comment) (Due to prolonged sinus arrest   and brain hypoperfusion patient developed a  generalized tonic clonic   seizure lasting 2 minutes before it was terminated using 2mg IV ativan.)  Length of time physician/provider present for 1:1 monitoring during   sedation: 30   EKG 12 lead   Result Value Ref Range    Systolic Blood Pressure  mmHg    Diastolic Blood Pressure  mmHg    Ventricular Rate 141 BPM    Atrial Rate  BPM    MA Interval  ms    QRS Duration 84 ms     ms    QTc 474 ms    P Axis  degrees    R AXIS 24 degrees    T Axis 13 degrees    Interpretation ECG       ** Critical Test Result: High HR , STEMI  Supraventricular tachycardia  ST elevation consider lateral injury or acute infarct  ** ** ACUTE MI / STEMI ** **  Abnormal ECG  When compared with ECG of 25-FEB-2024 18:37,  Right bundle branch block is no longer Present  Confirmed by GENERATED REPORT, COMPUTER ([id number]),  Dannie Cintron ([id number]) on 2/25/2024 8:15:42 PM  Also confirmed by GENERATED REPORT, COMPUTER ([id number]),  Boyd Plascencia ([id number])  on 2/25/2024 8:19:59 PM     Head CT w/o contrast    Narrative    EXAM: CT HEAD W/O CONTRAST  LOCATION: [facility]  DATE: 2/25/2024    INDICATION: seizure  COMPARISON: None.  TECHNIQUE: Routine CT Head without IV contrast. Multiplanar reformats. Dose reduction techniques were used.    FINDINGS:  INTRACRANIAL CONTENTS: No intracranial hemorrhage, extraaxial collection, or mass effect.  No CT evidence of acute infarct. Mild presumed chronic small vessel ischemic changes. Mild to moderate generalized volume loss. No hydrocephalus.     VISUALIZED ORBITS/SINUSES/MASTOIDS: Prior bilateral cataract surgery. Visualized portions of the orbits are otherwise unremarkable. No paranasal sinus mucosal disease. No middle ear or mastoid effusion.    BONES/SOFT TISSUES: No acute abnormality.      Impression    IMPRESSION:  1.  No CT evidence for acute intracranial process.  2.  Brain atrophy and presumed chronic microvascular ischemic changes as above.   CT Chest Pulmonary  Embolism w Contrast    Narrative    EXAM: CT CHEST PULMONARY EMBOLISM W CONTRAST  LOCATION: [facility]  DATE: 2/25/2024    INDICATION: Tachycardia. Evaluate for PE.  COMPARISON: None.  TECHNIQUE: CT chest pulmonary angiogram during arterial phase injection of IV contrast. Multiplanar reformats and MIP reconstructions were performed. Dose reduction techniques were used.   CONTRAST: 56mL Isovue 370    FINDINGS:  ANGIOGRAM CHEST: Pulmonary arteries are normal caliber and negative for pulmonary emboli. Thoracic aorta is negative for dissection.    LUNGS AND PLEURA: Mild bandlike scarring or atelectasis in the lower lungs. Mild reticular scarring in the lung apices. No focal consolidation or focal ground glass opacity. No pleural effusion or pneumothorax.    MEDIASTINUM/AXILLAE: No adenopathy. No pericardial effusion.    CORONARY ARTERY CALCIFICATION: Mild.    UPPER ABDOMEN: Normal.    MUSCULOSKELETAL: Mild scattered degenerative changes in the spine. Postoperative changes lumbar spine with hardware.      Impression    IMPRESSION:  No acute findings in the chest. Negative for pulmonary embolism. No evidence of pneumonia.   EKG 12-lead, tracing only   Result Value Ref Range    Systolic Blood Pressure  mmHg    Diastolic Blood Pressure  mmHg    Ventricular Rate 66 BPM    Atrial Rate 66 BPM    [place] Interval 188 ms    QRS Duration 98 ms     ms    QTc 457 ms    P Axis 81 degrees    R AXIS 38 degrees    T Axis 54 degrees    Interpretation ECG       Sinus rhythm with sinus arrhythmia with occasional Premature ventricular complexes  Otherwise normal ECG  When compared with ECG of 25-FEB-2024 19:38,  Premature ventricular complexes are now Present  Vent. rate has decreased BY  75 BPM  ST no longer depressed in Inferior leads  ST elevation has replaced ST depression in Anterior leads  T wave inversion no longer evident in Inferior leads  Confirmed by GENERATED REPORT, COMPUTER ([id number]),  Rhonda Hendricks,  Dannie ([id number]) on 2/25/2024 9:04:39 PM     CBC with Platelets & Differential    Narrative    The following orders were created for panel order CBC with Platelets & Differential.  Procedure                               Abnormality         Status                     ---------                               -----------         ------                     CBC with platelets and d...[[id number]]  Abnormal            Final result                 Please view results for these tests on the individual orders.   CBC with platelets and differential   Result Value Ref Range    WBC Count 5.3 4.0 - 11.0 10e3/uL    RBC Count 3.19 (L) 3.80 - 5.20 10e6/uL    Hemoglobin 10.1 (L) 11.7 - 15.7 g/dL    Hematocrit 31.0 (L) 35.0 - 47.0 %    MCV 97 78 - 100 fL    MCH 31.7 26.5 - 33.0 pg    MCHC 32.6 31.5 - 36.5 g/dL    RDW 13.2 10.0 - 15.0 %    Platelet Count 120 (L) 150 - 450 10e3/uL    % Neutrophils 65 %    % Lymphocytes 26 %    % Monocytes 8 %    % Eosinophils 1 %    % Basophils 0 %    % Immature Granulocytes 0 %    NRBCs per 100 WBC 0 <1 /100    Absolute Neutrophils 3.5 1.6 - 8.3 10e3/uL    Absolute Lymphocytes 1.4 0.8 - 5.3 10e3/uL    Absolute Monocytes 0.4 0.0 - 1.3 10e3/uL    Absolute Eosinophils 0.0 0.0 - 0.7 10e3/uL    Absolute Basophils 0.0 0.0 - 0.2 10e3/uL    Absolute Immature Granulocytes 0.0 <=0.4 10e3/uL    Absolute NRBCs 0.0 10e3/uL   Symptomatic Influenza A/B, RSV, & SARS-CoV2 PCR (COVID-19) Nose    Specimen: Nose; Swab   Result Value Ref Range    Influenza A PCR Negative Negative    Influenza B PCR Negative Negative    RSV PCR Negative Negative    SARS CoV2 PCR Negative Negative    Narrative    Testing was performed using the Xpert Xpress CoV2/Flu/RSV Assay on the Cepheid GeneXpert Instrument. This test should be ordered for the detection of SARS-CoV-2, influenza, and RSV viruses in individuals who meet clinical and/or epidemiological criteria. Test performance is unknown in asymptomatic patients. This test is for in  vitro diagnostic use under the FDA EUA for laboratories certified under CLIA to perform high or moderate complexity testing. This test has not been FDA cleared or approved. A negative result does not rule out the presence of PCR inhibitors in the specimen or target RNA in concentration below the limit of detection for the assay. If only one viral target is positive but coinfection with multiple targets is suspected, the sample should be re-tested with another FDA cleared, approved, or authorized test, if coinfection would change clinical management. This test was validated by the [employer] [facility]. These laboratories are certified under the Clinical Laboratory Improvement Amendments of 1988 (CLIA-88) as qualified to perform high complexity laboratory testing.   CBC with platelets differential    Narrative    The following orders were created for panel order CBC with platelets differential.  Procedure                               Abnormality         Status                     ---------                               -----------         ------                     CBC with platelets and d...[[id number]]  Abnormal            Final result                 Please view results for these tests on the individual orders.   CBC with platelets and differential   Result Value Ref Range    WBC Count 3.5 (L) 4.0 - 11.0 10e3/uL    RBC Count 3.98 3.80 - 5.20 10e6/uL    Hemoglobin 12.6 11.7 - 15.7 g/dL    Hematocrit 39.0 35.0 - 47.0 %    MCV 98 78 - 100 fL    MCH 31.7 26.5 - 33.0 pg    MCHC 32.3 31.5 - 36.5 g/dL    RDW 13.2 10.0 - 15.0 %    Platelet Count 140 (L) 150 - 450 10e3/uL    % Neutrophils 60 %    % Lymphocytes 30 %    % Monocytes 8 %    % Eosinophils 1 %    % Basophils 1 %    % Immature Granulocytes 0 %    NRBCs per 100 WBC 0 <1 /100    Absolute Neutrophils 2.1 1.6 - 8.3 10e3/uL    Absolute Lymphocytes 1.0 0.8 - 5.3 10e3/uL    Absolute Monocytes 0.3 0.0 - 1.3 10e3/uL    Absolute Eosinophils 0.0 0.0 - 0.7 10e3/uL     Absolute Basophils 0.0 0.0 - 0.2 10e3/uL    Absolute Immature Granulocytes 0.0 <=0.4 10e3/uL    Absolute NRBCs 0.0 10e3/uL         Clinically Significant Risk Factors Present on Admission                # Thrombocytopenia: Lowest platelets = 120 in last 2 days, will monitor for bleeding   # Hypertension: Home medication list includes antihypertensive(s)   # Circulatory Shock: required vasopressors within past 24 hours                 Cardiac Arrhythmia: Atrial flutter: Atypical  Supraventricular tachycardia

## 2024-02-26 NOTE — ED PROVIDER NOTES
"  History     Chief Complaint:  Palpitations       HPI   Kate Lima is a 83 year old female with a history of hypertension, hyperlipidemia, leukocytopenia, who presents to the ED for evaluation of heart palpitations that started 4 or 5 days ago at home. The initial episode lasted only a few minutes before spontaneously resolving. The patient reports onset of heart palpitations and shaking around 1740 earlier this evening when she was making dinner. She describes the feeling as rapid \"heart pounding\" and has continued since then.  She notes that she feels dizzy associated with the palpitations. She takes Tylenol or ibuprofen for back pain. Denies chest pain, shortness of breath, edema, or loss of consciousness. She denies taking blood thinners. Of note, the patient uses a walker at baseline. Reports previous diagnosis of costochondritis.  She denies any leg swelling.  She denies any other symptoms at this time.    Independent Historian:   None - Patient Only    Review of External Notes:  None    Medications:    Fosamax  Amoxil  Lipitor  Forteo  Rosuvastatin   Valsartan    Prolia  Doxycycline   Catlett   Lyrica    Past Medical History:    Arthritis  Atrophic vaginitis  Glaucoma  Leukocytopenia  Lichen planus  Osteoporosis  Raynaud's disease  Reflux  Cardiogenic shock   Seizure   Spinal stenosis  Calcific tendinitis  Vitamin B12 deficiency   Hypertension  Horseshoe kidney  Hyperlipidemia  Scoliosis  Vitamin D deficiency  Varicose veins   IBS   Degenerative spondylolisthesis   Costochondritis  Bilateral cataracts  Trochanteric bursitis   Synovial cyst, right knee    Past Surgical History:    Carpal tunnel surgery  New right and left thumb joints  3 spinal fusions  Right ulnar nerve adhesions  Cataract removal x2  Left thumb joint surgery  Colonoscopy  Keratectomy   Left total knee arthroplasty   Right total knee arthroplasty   Left knee arthroscopy   Eyelid surgery   Glaucoma laser procedure   Vein ablation   Cyst " excision, right third digit     Physical Exam   Patient Vitals for the past 24 hrs:   BP Temp Temp src Pulse Resp SpO2 Height Weight   02/26/24 0130 114/61 -- -- 61 (!) 9 100 % -- --   02/26/24 0115 109/66 -- -- 56 18 100 % -- --   02/26/24 0100 124/79 -- -- 68 (!) 36 100 % -- --   02/26/24 0045 120/70 -- -- 59 14 99 % -- --   02/26/24 0030 114/67 -- -- 58 (!) 9 100 % -- --   02/26/24 0015 117/65 -- -- 60 22 100 % -- --   02/26/24 0010 117/60 -- -- -- -- -- -- --   02/26/24 0005 119/65 -- -- -- -- -- -- --   02/26/24 0000 123/61 -- -- 58 15 100 % -- --   02/25/24 2355 116/68 -- -- -- -- -- -- --   02/25/24 2350 120/69 -- -- -- -- -- -- --   02/25/24 2345 (!) 88/61 -- -- 62 20 100 % -- --   02/25/24 2343 (!) 88/61 -- -- 60 16 -- -- --   02/25/24 2330 115/67 -- -- 64 26 99 % -- --   02/25/24 2300 113/62 -- -- 61 23 100 % -- --   02/25/24 2255 106/63 -- -- 57 27 100 % -- --   02/25/24 2250 109/56 -- -- 60 16 100 % -- --   02/25/24 2245 102/70 -- -- 60 10 100 % -- --   02/25/24 2240 105/61 -- -- 60 24 100 % -- --   02/25/24 2235 104/60 -- -- 61 22 100 % -- --   02/25/24 2230 120/65 -- -- 57 (!) 46 100 % -- --   02/25/24 2225 116/72 -- -- 57 28 100 % -- --   02/25/24 2220 119/71 -- -- 57 18 100 % -- --   02/25/24 2200 121/65 -- -- 66 25 100 % -- --   02/25/24 2155 121/63 -- -- 61 20 100 % -- --   02/25/24 2150 109/69 -- -- 69 (!) 36 100 % -- --   02/25/24 2145 106/64 -- -- 63 15 100 % -- --   02/25/24 2140 99/69 -- -- 61 16 100 % -- --   02/25/24 2135 110/59 -- -- 63 (!) 7 100 % -- --   02/25/24 2130 113/70 -- -- 64 10 100 % -- --   02/25/24 2120 104/59 -- -- 63 19 100 % -- --   02/25/24 2115 108/66 -- -- 60 22 99 % -- --   02/25/24 2110 (!) 84/55 -- -- 65 22 100 % -- --   02/25/24 2105 (!) 89/54 -- -- 64 11 100 % -- --   02/25/24 2100 102/59 -- -- 59 17 100 % -- --   02/25/24 2055 112/64 -- -- 58 25 100 % -- --   02/25/24 2050 (!) 110/95 -- -- 58 13 99 % -- --   02/25/24 2040 105/68 -- -- 65 29 99 % -- --   02/25/24  "2035 97/72 -- -- (!) 144 25 100 % -- --   02/25/24 2030 95/75 -- -- (!) 144 13 100 % -- --   02/25/24 2025 (!) 86/66 -- -- (!) 144 28 99 % -- --   02/25/24 2000 (!) 79/56 -- -- -- -- -- -- --   02/25/24 1955 (!) 127/92 -- -- (!) 158 26 100 % -- --   02/25/24 1950 (!) 143/101 -- -- (!) 140 (!) 58 99 % -- --   02/25/24 1945 93/74 -- -- (!) 144 (!) 0 100 % -- --   02/25/24 1940 (!) 79/65 -- -- (!) 151 (!) 34 98 % -- --   02/25/24 1935 (!) 72/46 -- -- (!) 158 18 (!) 69 % -- --   02/25/24 1930 (!) 65/45 -- -- (!) 158 17 (!) 88 % -- --   02/25/24 1915 (!) 88/61 -- -- (!) 165 12 96 % -- --   02/25/24 1900 -- -- -- (!) 160 29 96 % -- --   02/25/24 1841 (!) 145/107 98.8  F (37.1  C) Oral (!) 158 20 99 % 1.626 m (5' 4\") 56.7 kg (125 lb)   02/25/24 1835 (!) 145/107 -- -- (!) 156 18 98 % -- --        Physical Exam  General: Well appearing, nontoxic. Resting comfortably  Head:  Scalp, face, and head appear normal  Eyes:  Pupils are equal, round    Conjunctivae non-injected and sclerae white  ENT:    The external nose is normal    Pinnae are normal  Neck:  Normal range of motion    There is no rigidity noted    Trachea is in the midline  CV:  Tachycardic rate, regular rhythm     Normal S1/S2, no S3/S4    No murmur or rub. Radial pulses 2+ bilaterally.  Resp:  Lungs are clear and equal bilaterally  There is no tachypnea    No increased work of breathing    No rales, wheezing, or rhonchi  GI:  Abdomen is soft, no rigidity or guarding    No distension, or mass    No tenderness or rebound tenderness   MS:  Normal muscular tone    Symmetric motor strength    No lower extremity edema. No calf swelling or tenderness.  Skin:  No rash or acute skin lesions noted  Neuro:  Awake and alert  Speech is normal and fluent  Moves all extremities spontaneously  Psych: Normal affect. Appropriate interactions.     Emergency Department Course     ECG results from 02/25/24   EKG 12-lead, tracing only     Value    Systolic Blood Pressure     Diastolic " Blood Pressure     Ventricular Rate 152    Atrial Rate     IN Interval     QRS Duration 110        QTc 477    P Axis     R AXIS 9    T Axis 67    Interpretation ECG      ** Critical Test Result: High HR  Supraventricular tachycardia  Right bundle branch block  Abnormal ECG  Taken at 1837. Read at 1911 by Xavier Ontiveros MD.   EKG 12-lead, tracing only     Value    Systolic Blood Pressure     Diastolic Blood Pressure     Ventricular Rate 66    Atrial Rate 66    IN Interval 188    QRS Duration 98        QTc 457    P Axis 81    R AXIS 38    T Axis 54    Interpretation ECG      Sinus rhythm with sinus arrhythmia with occasional Premature ventricular complexes  Otherwise normal ECG  When compared with ECG of 25-FEB-2024 19:38,  Premature ventricular complexes are now Present  Vent. rate has decreased BY  75 BPM  ST no longer depressed in Inferior leads  ST elevation has replaced ST depression in Anterior leads  T wave inversion no longer evident in Inferior leads  Taken at 2052. Read at 2102 by Xavier Ontiveros MD.    EKG 12 lead     Value    Systolic Blood Pressure     Diastolic Blood Pressure     Ventricular Rate 141    Atrial Rate     IN Interval     QRS Duration 84        QTc 474    P Axis     R AXIS 24    T Axis 13    Interpretation ECG      ** Critical Test Result: High HR   Supraventricular tachycardia  Abnormal ECG  When compared with ECG of 25-FEB-2024 18:37,  Right bundle branch block is no longer Present  Taken at 1938. Read at 1939 by Xavier Ontiveros MD.     Imaging:  CT Chest Pulmonary Embolism w Contrast   Final Result   IMPRESSION:   No acute findings in the chest. Negative for pulmonary embolism. No evidence of pneumonia.      Head CT w/o contrast   Final Result   IMPRESSION:   1.  No CT evidence for acute intracranial process.   2.  Brain atrophy and presumed chronic microvascular ischemic changes as above.      -Cardioversion External    (Results Pending)        Laboratory:  Labs  Ordered and Resulted from Time of ED Arrival to Time of ED Departure   TROPONIN T, HIGH SENSITIVITY - Abnormal       Result Value    Troponin T, High Sensitivity 16 (*)    CBC WITH PLATELETS AND DIFFERENTIAL - Abnormal    WBC Count 5.3      RBC Count 3.19 (*)     Hemoglobin 10.1 (*)     Hematocrit 31.0 (*)     MCV 97      MCH 31.7      MCHC 32.6      RDW 13.2      Platelet Count 120 (*)     % Neutrophils 65      % Lymphocytes 26      % Monocytes 8      % Eosinophils 1      % Basophils 0      % Immature Granulocytes 0      NRBCs per 100 WBC 0      Absolute Neutrophils 3.5      Absolute Lymphocytes 1.4      Absolute Monocytes 0.4      Absolute Eosinophils 0.0      Absolute Basophils 0.0      Absolute Immature Granulocytes 0.0      Absolute NRBCs 0.0     BASIC METABOLIC PANEL - Normal    Sodium 139      Potassium 4.3      Chloride 102      Carbon Dioxide (CO2) 24      Anion Gap 13      Urea Nitrogen 21.1      Creatinine 0.71      GFR Estimate 83      Calcium 9.8      Glucose 97     TSH WITH FREE T4 REFLEX - Normal    TSH 2.09     MAGNESIUM - Normal    Magnesium 2.0     CBC WITH PLATELETS   INFLUENZA A/B, RSV, & SARS-COV2 PCR   VITAMIN B12        [facility]    -Cardioversion External    Date/Time: 2/25/2024 7:35 PM    Performed by: Xavier Ontiveros MD  Authorized by: Xavier Ontiveros MD    Emergent condition/consent implied    ED EVALUATION:      Assessment Time: 2/25/2024 7:30 PM      I have performed an Emergency Department Evaluation including taking a history and physical examination, this evaluation will be documented in the electronic medical record for this ED encounter.     Indication: Unstable narrrow complex tachycardia    ASA Class: Class 4- Severe systemic disease, acute unstable problems    Mallampati: Grade 2- soft palate, base of uvula, tonsillar pillars, and portion of posterior pharyngeal wall visible    NPO Status: not NPO, emergent situation    UNIVERSAL PROTOCOL   Site  Marked: NA  Prior Images Obtained and Reviewed:  NA  Required items: Required blood products, implants, devices and special equipment available    Patient identity confirmed:  Verbally with patient and arm band  Patient was reevaluated immediately before administering moderate or deep sedation or anesthesia  Confirmation Checklist:  Relevant allergies, patient's identity using two indicators, procedure was appropriate and matched the consent or emergent situation and correct equipment/implants were available  Universal Protocol: the Joint Commission Universal Protocol was followed      SEDATION  Patient Sedated: Yes    Sedation Type:  Moderate (conscious) sedation  Sedation:  Etomidate  Vital signs: Vital signs monitored during sedation      PRE-PROCEDURE DETAILS:     Cardioversion basis:  Emergent    Rhythm:  Supraventricular tachycardia    Electrode placement:  Anterior-posterior  Attempt one:     Cardioversion mode:  Synchronous    Waveform:  Biphasic    Shock (Joules):  150    Cardioversion outcome attempt one: Prolonged sinus arrest following shock lasting 15-20 seconds, followed by brief conversion to sinus rhythm for 30 seconds before the rapid supraventricular tachycardia recurred.      PROCEDURE    Patient complications:  Other (see comment) (Due to prolonged sinus arrest and brain hypoperfusion patient developed a generalized tonic clonic seizure lasting 2 minutes before it was terminated using 2mg IV ativan.)  Length of time physician/provider present for 1:1 monitoring during sedation: 30       Emergency Department Course & Assessments:    Interventions:  Medications   LORazepam (ATIVAN) injection 2 mg (has no administration in time range)   heparin infusion 25,000 units in D5W 250 mL ANTICOAGULANT (700 Units/hr Intravenous $New Bag 2/26/24 0127)   sodium chloride 0.9% BOLUS 1,000 mL (0 mLs Intravenous Stopped 2/25/24 2000)   LORazepam (ATIVAN) injection 2 mg (2 mg Intravenous $Given 2/25/24 1951)    phenylephrine (BONG-SYNEPHRINE) injection 100 mcg (100 mcg Intravenous $Given 2/25/24 1943)   iopamidol (ISOVUE-370) solution 56 mL (56 mLs Intravenous $Given 2/25/24 2003)   Saline (83 mLs Intravenous $Given 2/25/24 2003)   amiodarone (NEXTERONE) bolus 150 mg (0 mg Intravenous Stopped 2/25/24 2054)   sodium chloride 0.9% BOLUS 1,000 mL (0 mLs Intravenous Stopped 2/25/24 2337)   heparin loading dose for LOW INTENSITY TREATMENT * Give BEFORE starting heparin infusion (3,400 Units Intravenous $Given 2/26/24 0128)        Assessments, Independent Interpretation, Consults/Discussion of Management/Tests:  ED Course as of 02/26/24 0148   Sun Feb 25, 2024 2021 I performed an independent interpretation of the patient's Head CT. No large volume intracranial hemorrhage or midline shift seen.    2202 Case discussed with intensivist Dr. Cardoza   Mon Feb 26, 2024   0026 Case discussed with hospitalist Dr. Gomez who accepts the patient for admission to the hospital.     1911 I obtained patient history and performed a physical exam.   1935    I rechecked the patient and explained findings.   2015    I rechecked the patient and explained findings.   2055 I spoke with Dr. Hill of Cardiology regarding the patient's status.  2357 I rechecked the patient and explained findings.     Social Determinants of Health affecting care:  None    Disposition:  The patient was admitted to the hospital under the care of Dr. Gomez.     Impression & Plan      Medical Decision Making:  Kate Lima is a 83 year old female who presents to the ED for evaluation of palpitations.  Patient presented with rapid narrow complex tachycardia with rates right around 150.  Initial EKG without evidence of acute ischemia.  Rhythm at that time likely atrial flutter with 2:1 conduction.  Initial triage blood pressure was normal however patient developed hypotension, increasing dizziness and near syncope concerning for unstable cardiac dysrhythmia.  She was  urgently cardioverted which resulted in a prolonged sinus pause lasting 15 seconds.  Immediately post cardioversion she appeared to be in the brief period of sinus rhythm before reverting back to the narrow complex tachycardia/atrial flutter.  Immediately following this patient developed generalized tonic-clonic seizure activity and was treated with 2 mg of IV Ativan resulting in cessation of seizure activity.  Seizure likely provoked by brain hypoperfusion due to the prolonged sinus pause following cardioversion.  Patient has no history of prior seizures.  CT head was obtained and negative for any acute intracranial pathology.  CT chest negative for PE, pericardial effusion or any other acute abnormality.  Patient had hypotension following cardioversion due to the ongoing narrow complex tachycardia.  She was started on peripheral norepinephrine and amiodarone 150 mg bolus was given.  She then converted to normal sinus rhythm and the norepinephrine was eventually able to be titrated down and weaned off.  Patient received a total of 2 L of IV crystalloid resuscitation.  She was monitored in the emergency department remained stable.  She had no focal neurologic deficits and completely returned to her neurologic baseline.  Remainder of her ED evaluation was otherwise reassuring.  Given the findings and complex post cardioversion course patient will be admitted to the hospital for the monitoring evaluation and treatment.  The case was discussed with cardiology who agreed with plan of care.  The case was discussed with the hospitalist and the patient was admitted in stabilized and improved condition.    Critical Care Time:   Upon my evaluation, this patient had a critical illness with high probability of imminent or life-threatening deterioration due to acute unstable tachydysrhythmia, which required my direct attention, intervention, and personal management.    I have personally provided 50 minutes of critical care time  exclusive of time spent on separately billable procedures. Time includes review of laboratory data, radiology results, discussion with consultants, reassessment of the patient and monitoring for potential decompensation. Interventions were performed as documented above.     Diagnosis:    ICD-10-CM    1. Atrial flutter with rapid ventricular response (H)  I48.92       2. Seizure (H)  R56.9     response to hypoperfusion samantha-cardioversion               Scribe Disclosure:  IMary, am serving as a scribe trainee with scribe Reid at 9:23 PM on 2/25/2024 to document services personally performed by Xavier Ontiveros MD based on my observations and the provider's statements to me.   2/25/2024   Xavier Ontiveros MD Daro, Ryan Clay, MD  02/26/24 0242

## 2024-02-26 NOTE — PHARMACY-ADMISSION MEDICATION HISTORY
Pharmacist Admission Medication History    Admission medication history is complete. The information provided in this note is only as accurate as the sources available at the time of the update.    Information Source(s): Family member, CareEverywhere/SureScripts, and pt's med list  via in-person      Changes made to PTA medication list:  Added: Crestor, Valsartan  Deleted: Atorvastatin, Fosamax  Changed: None    Allergies reviewed with patient and updates made in EHR: no    Medication History Completed By: Asmita Martínez RP 2/26/2024 1:21 PM    Prior to Admission medications    Medication Sig Last Dose Taking? Auth Provider Long Term End Date   acetaminophen (TYLENOL) 500 MG tablet Take 1,000 mg by mouth 2 times daily 2/25/2024 Yes Reported, Patient     amoxicillin (AMOXIL) 500 MG tablet TAKE 4 TABLETS BY MOUTH 1 HOUR BEFORE PROCEDURE prn Yes Reported, Patient     Calcium Carbonate-Vitamin D (CALCIUM 600+D PO) Take 1 tablet by mouth daily 2/25/2024 Yes Reported, Patient     cholecalciferol (VITAMIN  -D) 1000 UNITS capsule Take 1 capsule by mouth daily. 2/25/2024 Yes Reported, Patient     denosumab (PROLIA) 60 MG/ML SOSY injection Inject 60 mg Subcutaneous every 6 months Unknown Yes Unknown, Entered By History Yes    fluocinonide (LIDEX) 0.05 % external gel Apply topically daily as needed (rash) prn Yes Reported, Patient     pimecrolimus (ELIDEL) 1 % external cream Apply topically daily as needed (rash/itching) prn Yes Reported, Patient     polyethylene glycol (MIRALAX) 17 g packet Take 1 packet by mouth daily as needed for constipation prn Yes Unknown, Entered By History     rosuvastatin (CRESTOR) 5 MG tablet Take 5 mg by mouth at bedtime 2/24/2024 Yes Unknown, Entered By History Yes    valsartan (DIOVAN) 160 MG tablet Take 160 mg by mouth at bedtime 2/24/2024 Yes Unknown, Entered By History Yes    vitamin B-12 (CYANOCOBALAMIN) 1000 MCG tablet Take 1,000 mcg by mouth daily 2/25/2024 Yes Unknown, Entered By  History

## 2024-02-26 NOTE — SIGNIFICANT EVENT
At approx 1940 Dr. Ontiveros determined need for emergent bedside cardioversion. Etomidate IV given, synchronized prior to delivery of 150j. An approximate 8 second pause was observed thereafter with noted myoclonic activity observed moments after shock. 2 mg ativan IV was ordered and given with total seizure activity lasting approximately 3 minutes with provider at bedside. Additional orders for head CT placed. Pt placed on transport monitor and moved to [facility] for further intervention.

## 2024-02-26 NOTE — PLAN OF CARE
Nam was admitted to CCU from the ED this morning. She has remained in a SR today. Started PO amiodarone. Will start Eliquis tonight. Needs echo. Anticipate discharge to home tomorrow. Ambulating and tolerating activity at baseline (SBA, walker, painful back and knees). Plan discussed with pt and family.

## 2024-02-26 NOTE — ED NOTES
RN called lab to check on labs. Lab stated that they only draw part of the 0730 labs not all. Lab to come draw now. RN on [facility] updated.

## 2024-02-26 NOTE — ED NOTES
Bed: ED14  Expected date:   Expected time:   Means of arrival:   Comments:  Ashlee 1 83F racing heart

## 2024-02-26 NOTE — PROGRESS NOTES
Nonbilling note: pt admitted earlier today by Dr Gomez; please refer to H&P done by Dr. Gomez; she presented with palpitation and was found to have an A-fib with RVR; while in ER she became hypotensive with telemetry showing a narrow complex tachycardia.  She underwent emergent cardioversion followed by a prolonged sinus arrest of 20 seconds; she was given a bolus of amiodarone followed by amiodarone infusion she was temporarily on Levophed-weaned off.  She converted back to NSR.  It seems that after her prolonged sinus node arrest she had a convulsive episode, [place] due to hypoxia, Ativan given.  Started on heparin drip and admitted to CCU  -Seen by cardiology and EP  -Started on amiodarone 200 mg p.o. twice daily for 2 weeks, followed by 200 mg p.o. daily  -Monitor on telemetry-in normal sinus rhythm at the time of my visit  -Echo pending  -Will switch her to Eliquis 5 mg p.o. twice daily  -No indication for AED at this time, if recurrent convulsive episode-would perform an MRI of the brain and EEG.  -Possible discharge tomorrow    Yana Keen, Hospitalist

## 2024-02-26 NOTE — ED TRIAGE NOTES
BIBA from home. Pt reports making dinner and experiencing palpitations, states this happened earlier this week once but she was able to resolve this by lying down and resting. Attempted to rest this evening, but HR did not improve prompting her to call EMS.     Triage Assessment (Adult)       Row Name 02/25/24 7439          Triage Assessment    Airway WDL WDL        Respiratory WDL    Respiratory WDL WDL        Skin Circulation/Temperature WDL    Skin Circulation/Temperature WDL WDL        Cardiac WDL    Cardiac WDL X;rhythm     Cardiac Rhythm ST        Peripheral/Neurovascular WDL    Peripheral Neurovascular WDL WDL        Cognitive/Neuro/Behavioral WDL    Cognitive/Neuro/Behavioral WDL WDL

## 2024-02-27 ENCOUNTER — ORDERS ONLY (AUTO-RELEASED) (OUTPATIENT)
Dept: CARDIOVASCULAR ICU | Facility: CLINIC | Age: 84
End: 2024-02-27

## 2024-02-27 ENCOUNTER — APPOINTMENT (OUTPATIENT)
Dept: CARDIOLOGY | Facility: CLINIC | Age: 84
DRG: 309 | End: 2024-02-27
Attending: HOSPITALIST
Payer: COMMERCIAL

## 2024-02-27 VITALS
OXYGEN SATURATION: 99 % | DIASTOLIC BLOOD PRESSURE: 93 MMHG | TEMPERATURE: 97.6 F | HEIGHT: 64 IN | SYSTOLIC BLOOD PRESSURE: 142 MMHG | HEART RATE: 63 BPM | WEIGHT: 130.6 LBS | BODY MASS INDEX: 22.3 KG/M2 | RESPIRATION RATE: 18 BRPM

## 2024-02-27 DIAGNOSIS — I48.92 ATRIAL FLUTTER WITH RAPID VENTRICULAR RESPONSE (H): ICD-10-CM

## 2024-02-27 LAB
ANION GAP SERPL CALCULATED.3IONS-SCNC: 8 MMOL/L (ref 7–15)
BUN SERPL-MCNC: 12.6 MG/DL (ref 8–23)
CALCIUM SERPL-MCNC: 8.5 MG/DL (ref 8.8–10.2)
CHLORIDE SERPL-SCNC: 109 MMOL/L (ref 98–107)
CREAT SERPL-MCNC: 0.64 MG/DL (ref 0.51–0.95)
DEPRECATED HCO3 PLAS-SCNC: 24 MMOL/L (ref 22–29)
EGFRCR SERPLBLD CKD-EPI 2021: 87 ML/MIN/1.73M2
GLUCOSE SERPL-MCNC: 88 MG/DL (ref 70–99)
LVEF ECHO: NORMAL
POTASSIUM SERPL-SCNC: 3.9 MMOL/L (ref 3.4–5.3)
SODIUM SERPL-SCNC: 141 MMOL/L (ref 135–145)

## 2024-02-27 PROCEDURE — 36415 COLL VENOUS BLD VENIPUNCTURE: CPT | Performed by: INTERNAL MEDICINE

## 2024-02-27 PROCEDURE — 250N000013 HC RX MED GY IP 250 OP 250 PS 637: Performed by: INTERNAL MEDICINE

## 2024-02-27 PROCEDURE — 99239 HOSP IP/OBS DSCHRG MGMT >30: CPT | Performed by: STUDENT IN AN ORGANIZED HEALTH CARE EDUCATION/TRAINING PROGRAM

## 2024-02-27 PROCEDURE — 250N000013 HC RX MED GY IP 250 OP 250 PS 637: Performed by: HOSPITALIST

## 2024-02-27 PROCEDURE — 99233 SBSQ HOSP IP/OBS HIGH 50: CPT | Mod: 25 | Performed by: INTERNAL MEDICINE

## 2024-02-27 PROCEDURE — 80048 BASIC METABOLIC PNL TOTAL CA: CPT | Performed by: INTERNAL MEDICINE

## 2024-02-27 PROCEDURE — 93306 TTE W/DOPPLER COMPLETE: CPT | Mod: 26 | Performed by: INTERNAL MEDICINE

## 2024-02-27 PROCEDURE — 93306 TTE W/DOPPLER COMPLETE: CPT

## 2024-02-27 RX ORDER — AMIODARONE HYDROCHLORIDE 200 MG/1
TABLET ORAL
Qty: 70 TABLET | Refills: 0 | Status: SHIPPED | OUTPATIENT
Start: 2024-02-27

## 2024-02-27 RX ADMIN — APIXABAN 2.5 MG: 2.5 TABLET, FILM COATED ORAL at 08:25

## 2024-02-27 RX ADMIN — AMIODARONE HYDROCHLORIDE 200 MG: 200 TABLET ORAL at 08:23

## 2024-02-27 RX ADMIN — ACETAMINOPHEN 650 MG: 325 TABLET, FILM COATED ORAL at 03:55

## 2024-02-27 ASSESSMENT — ACTIVITIES OF DAILY LIVING (ADL)
ADLS_ACUITY_SCORE: 27
ADLS_ACUITY_SCORE: 31
ADLS_ACUITY_SCORE: 27
ADLS_ACUITY_SCORE: 31
DEPENDENT_IADLS:: CLEANING
ADLS_ACUITY_SCORE: 27
ADLS_ACUITY_SCORE: 31
ADLS_ACUITY_SCORE: 27
ADLS_ACUITY_SCORE: 31
ADLS_ACUITY_SCORE: 27

## 2024-02-27 NOTE — DISCHARGE SUMMARY
[facility]  Hospitalist Discharge Summary      Date of Admission:  2/25/2024  Date of Discharge:  2/27/2024  3:44 PM  Discharging Provider: Ariel Anderson DO  Discharge Service: Hospitalist Service    Discharge Diagnoses     New onset atrial fibrillation with sinus node arrest and elevated Troponin  Convulsive syncope  Hypertension  Hyperlipidemia  Extensive past orthopedic history  Acute on chronic anemia  History of Raynaud's Syndrome  Leukopenia    Clinically Significant Risk Factors          Follow-ups Needed After Discharge   Follow-up Appointments     Follow-up and recommended labs and tests       - Continue amiodarone  - Continue apixiban   - Follow up with your cardiologist at [facility] for further management of   amiodarone and apixiban  - You are at increased bleeding risk due to apixiban, please return for   any obvious bleeding or if you fall at home  - Please follow up with your primary care provider within 2 weeks of   discharge.            Unresulted Labs Ordered in the Past 30 Days of this Admission       No orders found from 1/26/2024 to 2/26/2024.          Discharge Disposition   Discharged to home  Condition at discharge: Stable    Hospital Course     Kate Lima is a markedly pleasant 83 year old woman non-smoker with past medical history that is most notable for hypertension and hyperlipidemia, among others; who presents with palpitations and is found to have new onset atrial fibrillation with sinus node arrest causing convulsive syncope. Seen by EP cardiology. Started on amiodarone with conversion to NS. Started on apixiban 2.5 mg bid per cardiology for anti-coagulation. Discharged home 2/27 with plan for outpatient follow up with her cardiologist at [facility]. 2 week zio patch on discharge.     PLAN      New onset atrial fibrillation with sinus node arrest and elevated Troponin: Of note, Ms. Lima is followed through [facility] for hypertension and  hyperlipidemia. In 2017, her coronary calcium score was reportedly zero. Most recent TTE 10/2022 showed preserved LVEF, mild LAE, aortic sclerosis without stenosis, mild mitral regurgitation, mild to moderate TR, and borderline pulmonary hypertension. She is on Crestor and ARB (no AV lucian blockers reportedly).     Now, she presents for acute palpitations. In the ED, initial EKG showed SVT with rate near 150. While in the ED, she became hypotensive and acutely ill with narrow complex tachycardia. Flutter waves noted on the monitor. She underwent emergent cardioversion at 150 J, which produced prolonged sinus arrest for 20 seconds. She developed generalized convulsions. Levophed and Amiodarone and Phenylephrine boluses and Amiodarone infusion were started and she was given Ativan, which resolved the convulsions. She recovered consciousness. She remained initially in tachy-arrhythmia. With further Amiodarone however her heart converted to NSR and she is now fully awake and alert with sinus bradycardia, and now denies any acute complaints. WBC is normal. TSH is normal. Initial Troponin T is minimally elevated. CTPA and CTH show no acute pathology.     Overall, her symptoms seem most consistent with new onset acute atrial fibrillation/flutter, with sinus node arrest after cardioversion; underlying sick sinus syndrome could be possible. Other narrow complex arrhythmia could be possible. Recent onset of rhinorrhea suggests a possible viral trigger. CT is negative for PE. Elevated Troponin seems most likely due to demand ischemia from acute illness. We will rule out ACS.       -- CCU. IMC status. NPO. Heparin infusion started. Telemetry, serial enzymes, TTE. Cardiology consulted. Transitioned to Apixiban. TTE was unremarkable. EP consulted, cleared for discharge 2/27    -- Started on amiodarone     Convulsive syncope: Suspect due to sinus node arrest. She has no focal neurologic deficits. If syncope recurs would consider  MRI and EEG.     Hypertension: Resume home Diovan when verified  Hyperlipidemia: Resume home Crestor when verified     Extensive past orthopedic history: She has had bilateral TKA, left hip bursitis, right hand cyst removal, carpal tunnel surgeries, and multiple prior spine procedures for spinal stenosis, scoliosis, and osteoarthritis. She also has chronic left shoulder bursitis. Noted     Acute on chronic anemia: Baseline HGB 11-13. Now 10. No signs of bleeding. Macrocytic. History of B12 deficiency noted. B12 level was normal in 9/2023.    -- B12 level repeated; monitor CBC while hospitalized and anti-coagulated         Recent Labs   Lab Test 02/25/24  2354   HGB 10.1*      History of Raynaud's Syndrome: Noted    Leukopenia   WBC 3.5 on admission. No neutropenia. Repeat CBC at outpatient follow up.    Consultations This Hospital Stay   PHARMACY IP CONSULT  CARE MANAGEMENT / SOCIAL WORK IP CONSULT  CARDIOLOGY IP CONSULT  PHARMACY IP CONSULT  ELECTROPHYSIOLOGY IP CONSULT  PHARMACY LIAISON FOR MEDICATION COVERAGE CONSULT    Code Status   Prior    Time Spent on this Encounter   I, Ariel Anderson DO, personally saw the patient today and spent greater than 30 minutes discharging this patient.       Ariel Anderson DO  [facility]ONARY [facility]ARE UN[facility]  [address], [address]  [place] [address]  Phone: [phone]  ______________________________________________________________________    Physical Exam   Vital Signs: Temp: 97.6  F (36.4  C) Temp src: Oral BP: (!) 142/93 Pulse: 63   Resp: 18 SpO2: 99 % O2 Device: None (Room air)    Weight: 130 lbs 9.6 oz     Constitutional: Alert and oriented to person, place and time; no apparent distress  Respiratory: lungs clear to auscultation bilaterally  Cardiovascular: regular S1 S2  GI: abdomen soft non tender non distended bowel sounds positive  Musculoskeletal: no clubbing, cyanosis or edema  Neurologic: extra-ocular muscles intact; moves all four  extremities  Psychiatric: appropriate affect, insight and judgment    Primary Care Physician   Tim Matute    Discharge Orders      Primary Care Referral      Reason for your hospital stay    You were admitted with palpitations. You had attempted electrical cardioversion which did not resolve arhythmia.  You were then started on amiodarone and went back into normal sinus rhythm. Cardiology started you on amiodarone and apixiban this admission for your atrial fibrillation. You will follow up with your cardiologist at [facility] on discharge.     Follow-up and recommended labs and tests     - Continue amiodarone  - Continue apixiban   - Follow up with your cardiologist at [facility] for further management of amiodarone and apixiban  - You are at increased bleeding risk due to apixiban, please return for any obvious bleeding or if you fall at home  - Please follow up with your primary care provider within 2 weeks of discharge.     Activity    Your activity upon discharge: activity as tolerated, ambulate with your walker     Diet    Follow this diet upon discharge: Orders Placed This Encounter      Regular Diet Adult     Zio Patch Mail Out       Significant Results and Procedures   Most Recent 3 CBC's:  Recent Labs   Lab Test 02/26/24  0739 02/25/24  2354   WBC 3.5* 5.3   HGB 12.6 10.1*   MCV 98 97   * 120*     Most Recent 3 BMP's:  Recent Labs   Lab Test 02/27/24  0539 02/26/24  0952 02/25/24  1848    141 139   POTASSIUM 3.9 3.9 4.3   CHLORIDE 109* 109* 102   CO2 24 23 24   BUN 12.6 13.7 21.1   CR 0.64 0.52 0.71   ANIONGAP 8 9 13   TUNDE 8.5* 8.1* 9.8   GLC 88 91 97     Most Recent 2 LFT's:No lab results found.    Discharge Medications   Discharge Medication List as of 2/27/2024  1:38 PM        START taking these medications    Details   amiodarone (PACERONE) 200 MG tablet Take 200 mg (one tablet) twice daily for 14 days, the switch to 200 mg (one tablet) once daily, Disp-70 tablet, R-0, E-Prescribe       apixaban ANTICOAGULANT (ELIQUIS) 2.5 MG tablet Take 1 tablet (2.5 mg) by mouth 2 times daily, Disp-60 tablet, R-0, E-Prescribe           CONTINUE these medications which have NOT CHANGED    Details   acetaminophen (TYLENOL) 500 MG tablet Take 1,000 mg by mouth 2 times daily, Historical      amoxicillin (AMOXIL) 500 MG tablet TAKE 4 TABLETS BY MOUTH 1 HOUR BEFORE PROCEDURE, Historical      Calcium Carbonate-Vitamin D (CALCIUM 600+D PO) Take 1 tablet by mouth daily, Historical      cholecalciferol (VITAMIN  -D) 1000 UNITS capsule Take 1 capsule by mouth daily., 1 capsule, Oral, DAILY, Until Discontinued, Historical      denosumab (PROLIA) 60 MG/ML SOSY injection Inject 60 mg Subcutaneous every 6 months, Historical      fluocinonide (LIDEX) 0.05 % external gel Apply topically daily as needed (rash)Historical      pimecrolimus (ELIDEL) 1 % external cream Apply topically daily as needed (rash/itching)Historical      polyethylene glycol (MIRALAX) 17 g packet Take 1 packet by mouth daily as needed for constipation, Historical      rosuvastatin (CRESTOR) 5 MG tablet Take 5 mg by mouth at bedtime, Historical      valsartan (DIOVAN) 160 MG tablet Take 160 mg by mouth at bedtime, Historical      vitamin B-12 (CYANOCOBALAMIN) 1000 MCG tablet Take 1,000 mcg by mouth daily, Historical           Allergies   Allergies   Allergen Reactions    Bacitracin     Bacitracin-Polymyxin B      Other reaction(s): Erythema    Liquid Adhesive Rash     Uses sensitive bandaids

## 2024-02-27 NOTE — PLAN OF CARE
[id number]: A&Ox4. VSS. Remains in Sinus rhythm. Tolerating cardiac diet. Heparin discontinued, started Eliquis. Up with SBA. Denies pain. Echo ordered. Possible discharge home tomorrow.

## 2024-02-27 NOTE — PROGRESS NOTES
Status: Patient admitted on 2/25 with SVT and with narrow complex tachycardia. Flutter waves noted on the monitor, underwent emergent cardioversion at 150 J, which produced prolonged sinus arrest for 20 seconds   Vitals: VSS  Tele: Sinus josé miguel   Neuros: Intact ex generalized weakness  IV: PIV removed  Labs/Electrolytes: WNL  Resp/trach: Lung sounds clear throghout  Diet: Regular  Bowel status: Last BM yesterday per patient  : Voiding spontaneously  Skin: Generalized bruising throughout  Pain: Denies  Activity: Up with SBA  Social: Cooperative with cares  Plan: Discharge instructions reviewed with patient at bedside, educated patient on activity restrictions, when to seek medical attention, follow-up appointments, and medications, medications received from discharge pharmacy, zio patch to be mailed to patient, patient transported to front door via wheelchair, patient's son to transport patient home.

## 2024-02-27 NOTE — PROGRESS NOTES
EP Progress Note          Assessment and Plan:     Kate Lima is a 83 year old female with hypertension, hyperlipidemia, who presented to the ED on 2/25/2024 with palpitations.  We were asked to see the patient for sinus arrest following cardioversion.  Initial EKG showed a regular narrow complex tachycardia with rates around 150 bpm, likely in SVT vs Aflutter.  She had a prolonged sinus pause after cardioversion.  She had recurrent SVT and was given one dose of IV amiodarone and converted back to sinus rhythm.  She has been in sinus rhythm with normal rates overnight.   Her long sinus pause occurred after electrical cardioversion so I would not recommend pacemaker at this time.  I would recommend starting an anti-arrhythmic drug to prevent recurrent SVT/Aflutter.  We started PO amiodarone yesterday.  She has remained in sinus rhythm overnight with HR around 60 bpm.   We discussed the potential side effects of amiodarone.  I did reassure her it will not interfere with her current medications.  She is normally followed through [facility] and will make an appointment with her regular cardiologist after discharge.     - Echo pending - normal    1. Normal biventricular size and function. Left ventricular ejection fraction of 60-65%.  2. No segmental wall motion abnormalities noted.  3. No hemodynamically significant valvular disease.  No prior study for comparison.    - Continue amiodarone 200 mg BID for 2 weeks, then 200 mg daily  - 2 week Ziopatch monitor  - Anticipate discharge today if echo is normal      Alvaro Cervantes MD                Interval History:     No events overnight.  Feeling well this morning.  Tolerating new medications.      Telemetry shows sinus rhythm, no pauses         Medications:       Current Facility-Administered Medications Ordered in Epic   Medication Dose Route Frequency Last Rate Last Admin    acetaminophen (TYLENOL) tablet 650 mg  650 mg Oral Q4H PRN   650 mg at 02/27/24 0355    Or     "acetaminophen (TYLENOL) Suppository 650 mg  650 mg Rectal Q4H PRN        amiodarone (PACERONE) tablet 200 mg  200 mg Oral BID   200 mg at 02/27/24 0823    apixaban ANTICOAGULANT (ELIQUIS) tablet 2.5 mg  2.5 mg Oral BID   2.5 mg at 02/27/24 0825    benzocaine-menthol (CHLORASEPTIC) 6-10 MG lozenge 1 lozenge  1 lozenge Buccal Q1H PRN        calcium carbonate (TUMS) chewable tablet 1,000 mg  1,000 mg Oral 4x Daily PRN        guaiFENesin-dextromethorphan (ROBITUSSIN DM) 100-10 MG/5ML syrup 10 mL  10 mL Oral Q4H PRN        lidocaine (LMX4) cream   Topical Q1H PRN        lidocaine 1 % 0.1-1 mL  0.1-1 mL Other Q1H PRN        LORazepam (ATIVAN) injection 2 mg  2 mg Intravenous Once PRN seizures        melatonin tablet 3 mg  3 mg Oral At Bedtime PRN        No anticoagulants IF patient has had acute trauma/surgery or recent intracranial, GI or urinary tract bleeding.    Other DOES NOT GO TO MAR        ondansetron (ZOFRAN ODT) ODT tab 4 mg  4 mg Oral Q6H PRN        Or    ondansetron (ZOFRAN) injection 4 mg  4 mg Intravenous Q6H PRN        polyethylene glycol (MIRALAX) Packet 17 g  17 g Oral Daily PRN        rosuvastatin (CRESTOR) tablet 5 mg  5 mg Oral At Bedtime   5 mg at 02/26/24 2111    senna-docusate (SENOKOT-S/PERICOLACE) 8.6-50 MG per tablet 1 tablet  1 tablet Oral BID PRN        Or    senna-docusate (SENOKOT-S/PERICOLACE) 8.6-50 MG per tablet 2 tablet  2 tablet Oral BID PRN        sodium chloride (PF) 0.9% PF flush 3 mL  3 mL Intracatheter Q8H   3 mL at 02/27/24 0825    sodium chloride (PF) 0.9% PF flush 3 mL  3 mL Intracatheter q1 min prn         No current Epic-ordered outpatient medications on file.             Review of Systems: If done, described below  The Review of Systems is negative other than noted in the HPI             Physical Exam:   /52   Pulse 73   Temp 97.6  F (36.4  C) (Oral)   Resp 18   Ht 1.626 m (5' 4\")   Wt 59.2 kg (130 lb 9.6 oz)   SpO2 99%   BMI 22.42 kg/m        Vital Sign " Ranges  Temperature Temp  Av.7  F (36.5  C)  Min: 97.5  F (36.4  C)  Max: 97.9  F (36.6  C)   Blood pressure Systolic (24hrs), Av , Min:117 , Max:145        Diastolic (24hrs), Av, Min:52, Max:84      Pulse Pulse  Av.3  Min: 54  Max: 73   Respirations Resp  Av.2  Min: 9  Max: 18   Pulse oximetry SpO2  Av.1 %  Min: 96 %  Max: 100 %         Intake/Output Summary (Last 24 hours) at 2024 0839  Last data filed at 2024 0400  Gross per 24 hour   Intake 1325 ml   Output --   Net 1325 ml       Constitutional: Well nourished and in no apparent distress.  Respiratory: Breathing non-labored.   Cardiovascular:  Regular rate and rhythm  Extremities: No edema.  Neurologic: Alert, awake, and oriented to person, place and time.  Psychiatric: Affect appropriate.               Data:     Telemetry: Sinus rhythm, rate 60 bpm      Last Comprehensive Metabolic Panel:  Lab Results   Component Value Date     2024    POTASSIUM 3.9 2024    CHLORIDE 109 (H) 2024    CO2 24 2024    ANIONGAP 8 2024    GLC 88 2024    BUN 12.6 2024    CR 0.64 2024    GFRESTIMATED 87 2024    TUNDE 8.5 (L) 2024       CBC RESULTS:   Recent Labs   Lab Test 24  0739   WBC 3.5*   RBC 3.98   HGB 12.6   HCT 39.0   MCV 98   MCH 31.7   MCHC 32.3   RDW 13.2   *

## 2024-02-27 NOTE — PLAN OF CARE
Neuro- A/Ox4  Tele/Cardiac- SR w/ PVCs  Resp- on RA, LS clear  Activity- SBA w/ walker  Pain- PRN Tylenol given for back pain  Drips- n/a  Drains/Tubes- PIV  Skin- scattered bruising  GI/- WDL  Plan- echo, likely discharge home today w/ Kirk Sotelo RN

## 2024-10-19 ENCOUNTER — HEALTH MAINTENANCE LETTER (OUTPATIENT)
Age: 84
End: 2024-10-19

## 2025-01-21 NOTE — TELEPHONE ENCOUNTER
FUTURE VISIT INFORMATION      FUTURE VISIT INFORMATION:  Date: 4/14/25  Time: 10:30AM  [place] [facility] [place]  REFERRAL INFORMATION:  Referring provider:    Referring providers clinic:    Reason for visit/diagnosis  Hoarseness, Pt was seen with Dr Lafleur on 4/18/22     RECORDS REQUESTED FROM:       Clinic name Comments Records Status Imaging Status   [facility] rehab  11/8/23- OV Shana Youngblood, SLP     More visit in epic  Epic     [facility] voice/ ent  4/18/22- Ov wOlga Lafleur and Aguila Almaguer, SLP  [facility]     Imaging  2/25/24- CT Chest + CT Head  [facility]  PACS

## 2025-04-14 ENCOUNTER — OFFICE VISIT (OUTPATIENT)
Dept: OTOLARYNGOLOGY | Facility: CLINIC | Age: 85
End: 2025-04-14
Payer: COMMERCIAL

## 2025-04-14 ENCOUNTER — PRE VISIT (OUTPATIENT)
Dept: OTOLARYNGOLOGY | Facility: CLINIC | Age: 85
End: 2025-04-14

## 2025-04-14 DIAGNOSIS — J38.7 PRESBYLARYNGES: ICD-10-CM

## 2025-04-14 DIAGNOSIS — R49.0 DYSPHONIA: Primary | ICD-10-CM

## 2025-04-14 PROCEDURE — 92524 BEHAVRAL QUALIT ANALYS VOICE: CPT | Mod: GN | Performed by: SPEECH-LANGUAGE PATHOLOGIST

## 2025-04-14 NOTE — PROGRESS NOTES
"[facility]  Clinical Voice and Upper Airway Evaluation Report    Patient's Name: Kate Lima  Date of Evaluation: 4/14/2025  Providing SLP: Federica Hannah MS CCC-SLP  Seen in Conjunction With: Almaz Lafleur MD MPH  Referring Provider and Facility: \" \"  Insurance Coverage: [employer]   Chief Complaint: Dysphonia  Evaluation Location: [facility]  Time of Evaluation: 10:44 AM - 12:15 PM      Patient History:    Nam is an 84-year-old female who presents today for evaluation of dysphonia. She has been seen by our clinic in 2013 for evaluation (referred to Bijan Gage with [facility] at that time), late 4990-4514 for a course of voice therapy, and again for evaluation in 2022 for laryngeal hyperfunction and mild vocal fold bowing. She frequently notes concerns of throat cancer given a close family history.    Dysphonia:   Onset: several years  Progression: gradually worsening  Concerns  Lacks power and strength  Difficult to be heard, especially in noisy environments  Feels raspy  Previous voice therapy or other interventions: voice therapy has \"not overwhelmingly helpful\" in the past    Dyspnea: secondary to cardiac issues    Dysphagia:   Concerns  Has nutcracker esophagus  Sometimes large pills get stuck  Occasional coughing with eating or drinking  Unable to identify which foods, any patterns  Chewing is difficult secondary to bone/jaw and teeth issues on the R side  Difficult items: hard foods due to dental issues  GERD symptoms: denies    Cough / Throat Clearing: denies outside of intermittently with eating/drinking    Throat Pain: denies    Medical / Surgical History:   Got hearing aids in December 2024      Quality of Life Questionnaires:        6/10/2013    10:00 AM 4/18/2022     8:56 AM 4/14/2025    10:14 AM   Voice Handicap Index (VHI-10)   My voice makes it difficult for people to hear me  3 2   People have difficulty understanding me in a noisy room " " 2 2   My voice difficulties restrict my personal and social life.   0 0   I feel left out of conversations because of my voice  2 2   My voice problem causes me to lose income  0 0   I feel as though I have to strain to produce voice  2 2   The clarity of my voice is unpredictable  2 2   My voice problem upsets me  1 1   My voice makes me feel handicapped  0 1   People ask, \"What's wrong with your voice?\"  2 1   VHI-10 3 14 13        Patient-reported       Perceptual Analysis (53220): Evaluation of Voice / Speech / Non-Communicative Laryngeal Behaviors    The GRBAS is a perceptual rating of voice change. 0 indicates no impairment, 3 indicates a severe impairment. \"C\" and \"I\" may be used to signify if these feature was observed consistently or intermittently respectively. This is a rating based on clinical judgement of disordered voice quality.  G ( 2-3 ) General Dysphonia     R ( 2-3 ) Roughness     B ( 1 ) Breathiness     A ( 2 ) Asthenia     S ( 1-2 ) Strain    *Validity of this measure may be slightly reduced when performed via acoustic signal from virtual visit*    Laryngeal palpation:   Thyrohyoid space: ***  Suprahyoid region: ***  Laryngeal lateralization: ***    Additional observations:   Cough/ Throat Clear: not observed today    Breathing patterns: appropriate at rest  Overt tension: \" \"   Habitual pitch: WNL compared to age- and gender-matched peers ***  Pitch range: \" \" ***  Resonance: often back-focused  Loudness: moderately reduced      Acoustic and Aerodynamic Analysis (27910):    Voice samples were recorded and analyzed within N(i)Â² software with aerodynamic information taken in [employer] PAS software. *** 52 modifier will be used for billing purposes, as aerodynamic evaluation could not be completed    Normative data:  Jitter %: less than 1  Shimmer dB: less than 0.35  NHR: less than 0.194  Average f0 in connected speech: 170-220 female, 100-150 male  CPPS: greater than 19.10 for " "Praat    Interpretation:  ***  Acoustic findings of *** elevated perturbation features*** are consistent with perceptual finding of ***  Aerodynamic finding of *** low air flow and *** elevated peak air flow pressure are consistent with patient reported increased effort.      Laryngoscopy with stroboscopy:    Provider performing exam: Almaz Lafleur MD MPH    Informed consent: Informed verbal consent was obtained, which includes potential side-effects, risks, and benefits of the procedure.     Anesthetic: Topical anesthesia with 3% lidocaine and 0.25% phenylephrine was applied the nostrils bilaterally.     Scope type: A distal chip flexible laryngoscope was passed through the nare with halogen and xenon light source(s).    The laryngeal and pharyngeal structures were evaluated for gross appearance, mobility, function, and focal lesions / abnormalities of the associated mucosa.  Velar Function: complete closure without bubbling of secretions and no weakness observed ***  General Appearance: WNL ***  Secretions: WNL ***  Subglottis Appearance: visible portion is patent ***  R Arytenoid Abduction / Adduction: symmetrical and timely ab/adduction with appropriate range of motion ***  L Arytenoid Abduction / Adduction: \" \" ***  Mediolateral Compression: WNL ***  Anteroposterior Compression: WNL ***  Vocal Fold Elongation: appropriate ***  Left (L) Vocal Fold Edge and Mucosa: white with smooth and straight appearance ***  Right (R) Vocal Fold Edge and Mucosa: \" \" ***  Narrow Band Imaging: demonstrates similar findings as halogen light ***  Glottic Closure: complete ***  Phase Closure: predominantly open phase ***  Vertical Level of Approximation: true vocal fold appear to adduct at the same height ***  L Amplitude: WNL ***  R Amplitude: WNL***  L Mucosal Wave: WNL ***  R Mucosal Wave: WNL ***  Phase Symmetry: symmetrical ***  Periodicity: periodic ***  Inhalation Phonation: similar findings to exhalation phonation " "***  Other Observations: ***  Patient was asked to mimic how it felt and sounded when they were experiencing dyspnea, and this demonstrated *** inappropriate vocal fold adduction of ***% of the glottis with *** hooding of the arytenoid complexes and adduction of the true vocal folds, yielding stridor  Therapeutic Probes: ***  Humming resulted in ***  Flow/high airflow stimuli resulted in ***  Glottal coup resulted in ***  Increased volume resulted in ***  Rescue breathing techniques utilizing brisk, nasal inhalation and pursed lip inspiration with prolonged, high pressure exhalation revealed *** maximal vocal fold abduction with maintenance of the glottic airway during exhalation  A coughing attack was triggered during laryngoscopy today, and pulsed \"sh\" resulted in reduced length and severity of the attack    FEES: Evaluation was performed by *** and Dr. Guzman. Impressions from this clinical document indicates: \"***\"       Therapeutic Techniques Attempted (53738 for Individual Speech Therapy):    ***       Impressions and Plan:     Nam is an 84-year-old female presenting today with dysphonia R49.0 secondary to ***. Perceptually, her voice is moderately to significantly rough with moderate weakness and mild to moderate strain and breathiness. Laryngoscopy today demonstrated ***. Therefore, *** has been recommended. ***. *** is in agreement with this plan of care.   ***  Schedule therapy in a couple weeks - have her call  Poor stimulability  Talk with cardio and pcp about breathing    RESEARCH: A warm introduction was *** provided regarding participation in laryngology research studies. This patient is *** interested in being contacted.    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 *** is in agreement with this plan of care and plans to check with her insurance about coverage prior to beginning sessions. - Goals: to improve and maintain a healthy voice quality, to understand the problem and fix it as much as possible, report resolution of symptoms, and use of optimal voice quality and comfort to meet personal, social, and professional needs, 90% of the time during a typical week of vocal activities      Goals:    VCD/PVFM  Gradually decrease VCD episodes to increase quality of life and ability to participate in high level cardio activity without limitations  Perform rescue breathing techniques while asymptomatic to improve automatic response when experiencing dyspnea  Perform rescue breathing techniques before and during exercise to prevent dyspnea/severe VCD attack  Perform rescue breathing during exercise or when exposed to a trigger item to resolve a VCD attack or coughing episode  Continue participating in high level cardio activity while performing rescue breathing to resolve dyspnea  Utilize abdominal breathing techniques in targeted activities (e.g. physical exercise, communication, high-level phonation/pitch range navigation exercises, etc.)  Rebalance laryngeal musculature and strengthen inspiratory muscles resulting in decreased laryngeal sensitivity and decreased frequency of VCD episodes.?  Demonstrate appropriate vocal hygiene including,  but not limited to, adequate hydration and integrating behavioral and dietary changes for GERD into their daily life.?   Recoordinate respiratory and laryngeal musculature to resume activities of daily living.?   Patient will perform advanced breathing exercises with the respiratory  focusing on inspiratory muscle strengthening with minimal assistance 90% of the time.  Patient will demonstrate abdominal focused breathing technique in targeted exercises with minimal assistance 90% of the time.?   Patient will demonstrate abdominal focused breathing technique with physical activity with minimal assistance 90% of the time.  Gradually reduce inappropriate and excessive inhaler use  Demonstrate a 50% reduction in Dyspnea Index score  Patient will express satisfaction with their breathing and their ability to participate in social, personal, and work/school functions without limitation    Chronic Cough  Decrease cough in all activities of daily living using compensation strategies  Independently implement a cough suppression strategy when cough trigger is sensed 90% of the time.?   Decrease the number of coughs per day by 50%   Demonstrate increased tolerance of a chemical and/or environmental irritant as evidenced by increased exposure (decreased proximity and/or increased strength) to that irritant by 50%   Demonstrate a 50% reduction in Cough Severity Index score  Patient will express satisfaction with their coughing and their ability to participate in social, personal, and work/school functions without limitation     Voice  Coordinate phonatory and respiratory subsystems, demonstrating adequate independence for activities of daily communication   Decrease extrinsic laryngeal muscle activity during communication events   Improve voice quality in all activities of daily living using, as measured by a minimum of a 50% point reduction on the Voice Handicap Index-10 or Singing VHI  Demonstrate appropriate vocal  hygiene including, but not limited to, adequate hydration, avoiding vocal abuse, integrating behavioral and dietary changes for GERD into their daily life?.   Incorporate behaviors to reduce irritation and promote laryngeal healing and prevent recurrence.?   Implement behavioral strategies to reduce laryngopharyngeal reflux. ?   Independently maintain a forward focus voice placement 90% of the time during conversational speech.  Independently perform the Vocal Function Exercises, rebalancing respiration, phonation, and resonance 90% of the time  Perform High Level Phonation and Pitch Range Navigation Exercises independently 90% of the time  Patient will express satisfaction with their voice quality and function and their ability to participate in social, personal, and work/school functions without limitation    Perioperative  Adhere to complete vocal rest recommendations in the acute post-operative period  Gradually increase voice use following the complete voice rest period  Adhere to vocal hygiene recommendations including smoking cessation, hydration, avoidance of caffeine and alcohol, and behavioral reflux precautions  Perform rescue breathing techniques several times daily  Incorporate SOVT exercises gradually in the immediate post-operative period   Participate in pre- and post-operative voice therapy      Billed Procedures:    Individual speech therapy session 13682  Perceptual voice assessment 23682  Laryngeal function studies 42660 with 52 modifier  Assessment and treatment time: *** minutes  Chart review, interpretation of testing, documentation preparation, etc: 6 minutes      Thank you for allowing me to participate in this patient's care,    Federica Hannah MS CCC-SLP  Speech-Language Pathologist  [facility]  Department of Otolaryngology - Head and Neck Surgery  [facility]  [email or web address]  Direct: [phone]  [phone]    *This note may  have been completed using [id number] dictation software, so errors may exist. Please contact me for clarification if needed*

## 2025-04-25 NOTE — CONSULTS
Care Management Initial Consult    General Information  Assessment completed with: Patient, Nam  Type of CM/SW Visit: Initial Assessment    Primary Care Provider verified and updated as needed: Yes   Readmission within the last 30 days: no previous admission in last 30 days      Reason for Consult: discharge planning  Advance Care Planning:            Communication Assessment  Patient's communication style: spoken language (English or Bilingual)    Hearing Difficulty or Deaf: no   Wear Glasses or Blind: yes    Cognitive  Cognitive/Neuro/Behavioral: WDL  Level of Consciousness: alert  Arousal Level: opens eyes spontaneously  Orientation: oriented x 4  Mood/Behavior: calm, cooperative, behavior appropriate to situation  Best Language: 0 - No aphasia  Speech: clear, spontaneous, logical    Living Environment:   People in home: spouse  Jared  Current living Arrangements: house      Able to return to prior arrangements:         Family/Social Support:  Care provided by: self  Provides care for: no one  Marital Status:   , Children  Jared       Description of Support System: Supportive, Involved         Current Resources:   Patient receiving home care services: No     Community Resources: None  Equipment currently used at home: grab bar, toilet, grab bar, tub/shower, walker, standard, walker, rolling  Supplies currently used at home:      Employment/Financial:  Employment Status: retired        Financial Concerns: none           Does the patient's insurance plan have a 3 day qualifying hospital stay waiver?  No    Lifestyle & Psychosocial Needs:  Social Determinants of Health     Food Insecurity: Not on file   Depression: Not at risk (4/18/2022)    PHQ-2     PHQ-2 Score: 0   Housing Stability: Not on file   Tobacco Use: Low Risk  (2/26/2024)    Patient History     Smoking Tobacco Use: Never     Smokeless Tobacco Use: Never     Passive Exposure: Not on file   Financial Resource Strain: Not on file   Alcohol  Patient scheduled with Dr. Lowery on 04/28/2025 at [facility].  Patient denies any known infection or use of antibiotics in the past 2 weeks.  Patient denies any new prescription of blood thinners not listed on medication list currently.    Patient instructed to arrive at 1 pm.  Light breakfast encouraged; morning medications OK to take.  Patient should have transportation home.  Verbalizes understanding.   Use: Not on file   Transportation Needs: Not on file   Physical Activity: Not on file   Interpersonal Safety: Not on file   Stress: Not on file   Social Connections: Not on file       Functional Status:  Prior to admission patient needed assistance:   Dependent ADLs:: Ambulation-walker  Dependent IADLs:: Cleaning       Mental Health Status:  Mental Health Status: No Current Concerns       Chemical Dependency Status:  Chemical Dependency Status: No Current Concerns             Values/Beliefs:  Spiritual, Cultural Beliefs, Rastafari Practices, Values that affect care: no               Additional Information:  Consult for discharge planning.  Met with patient and introduced self and role.  Pt shared she lives in her home with her , Jared.  Pt shared Jared still works as an .  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      Pt stated there are grab bars within both bathrooms.  The house is a split level with 6 stairs up and 6 stairs down from the landing.  Pt shared she has a 4 wheel walker with bench and a standard walker she uses when outside of the home.    Pt requested to have medical records sent to her and was given Release of Information form she filled out and was sent to HIM.    Offered to schedule hospital follow up appointments with pt primary and cardiologist, both of whom are in the [facility] system, and pt declined, as she wants to schedule herself.  Discussed that her providers can access/view her records through [employer].    Pt stated her son will be transporting to home.    Pati Polanco RN, BS  Care Coordinator  [email or web address]

## 2025-04-28 ENCOUNTER — TELEPHONE (OUTPATIENT)
Dept: OTOLARYNGOLOGY | Facility: CLINIC | Age: 85
End: 2025-04-28
Payer: COMMERCIAL

## 2025-04-29 ENCOUNTER — PATIENT OUTREACH (OUTPATIENT)
Dept: OTOLARYNGOLOGY | Facility: CLINIC | Age: 85
End: 2025-04-29
Payer: COMMERCIAL

## 2025-04-29 NOTE — PROGRESS NOTES
Called patient to let her know that the SLP scheduling was having a hard time reaching out. Provided patient with the number to schedule voice therapy if she was still interested Also provided patient with writer's direct number if she had any questions or concerns in the meantime. Elena Roque RN on 4/29/2025 at 9:45 AM